# Patient Record
Sex: MALE | Race: WHITE | NOT HISPANIC OR LATINO | Employment: STUDENT | URBAN - METROPOLITAN AREA
[De-identification: names, ages, dates, MRNs, and addresses within clinical notes are randomized per-mention and may not be internally consistent; named-entity substitution may affect disease eponyms.]

---
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
## 2018-05-29 ENCOUNTER — HOSPITAL ENCOUNTER (EMERGENCY)
Facility: HOSPITAL | Age: 23
Discharge: HOME | End: 2018-05-29
Attending: EMERGENCY MEDICINE
Payer: MEDICAID

## 2018-05-29 VITALS
HEART RATE: 102 BPM | RESPIRATION RATE: 18 BRPM | OXYGEN SATURATION: 96 % | WEIGHT: 103 LBS | TEMPERATURE: 100 F | SYSTOLIC BLOOD PRESSURE: 129 MMHG | DIASTOLIC BLOOD PRESSURE: 62 MMHG

## 2018-05-29 DIAGNOSIS — H66.002 ACUTE SUPPURATIVE OTITIS MEDIA OF LEFT EAR WITHOUT SPONTANEOUS RUPTURE OF TYMPANIC MEMBRANE, RECURRENCE NOT SPECIFIED: ICD-10-CM

## 2018-05-29 DIAGNOSIS — R50.9 ACUTE FEBRILE ILLNESS: Primary | ICD-10-CM

## 2018-05-29 DIAGNOSIS — H61.22 IMPACTED CERUMEN OF LEFT EAR: ICD-10-CM

## 2018-05-29 PROCEDURE — 25000003 PHARM REV CODE 250: Performed by: NURSE PRACTITIONER

## 2018-05-29 PROCEDURE — 99283 EMERGENCY DEPT VISIT LOW MDM: CPT | Mod: 25

## 2018-05-29 PROCEDURE — 69209 REMOVE IMPACTED EAR WAX UNI: CPT

## 2018-05-29 RX ORDER — AMOXICILLIN 875 MG/1
875 TABLET, FILM COATED ORAL 2 TIMES DAILY
Qty: 14 TABLET | Refills: 0 | Status: SHIPPED | OUTPATIENT
Start: 2018-05-29 | End: 2019-05-23

## 2018-05-29 RX ORDER — DEXTROAMPHETAMINE SACCHARATE, AMPHETAMINE ASPARTATE MONOHYDRATE, DEXTROAMPHETAMINE SULFATE AND AMPHETAMINE SULFATE 5; 5; 5; 5 MG/1; MG/1; MG/1; MG/1
20 CAPSULE, EXTENDED RELEASE ORAL EVERY MORNING
Status: ON HOLD | COMMUNITY
End: 2019-08-14 | Stop reason: HOSPADM

## 2018-05-29 RX ORDER — IBUPROFEN 400 MG/1
400 TABLET ORAL
Status: COMPLETED | OUTPATIENT
Start: 2018-05-29 | End: 2018-05-29

## 2018-05-29 RX ORDER — AMOXICILLIN 250 MG/1
1000 CAPSULE ORAL
Status: COMPLETED | OUTPATIENT
Start: 2018-05-29 | End: 2018-05-29

## 2018-05-29 RX ORDER — CLONIDINE HYDROCHLORIDE 0.1 MG/1
0.1 TABLET ORAL 2 TIMES DAILY
COMMUNITY
End: 2019-05-23

## 2018-05-29 RX ADMIN — AMOXICILLIN 1000 MG: 250 CAPSULE ORAL at 11:05

## 2018-05-29 RX ADMIN — IBUPROFEN 400 MG: 400 TABLET, FILM COATED ORAL at 10:05

## 2018-05-29 RX ADMIN — CARBAMIDE PEROXIDE 6.5% 5 DROP: 6.5 LIQUID AURICULAR (OTIC) at 10:05

## 2018-05-29 NOTE — ED PROVIDER NOTES
Encounter Date: 5/29/2018  Triage Evaluation: This is an initial triage evaluation. Patient is aware that he is awaiting a bed in the ED and that another provider will follow up on orders placed in triage.  This is a 23 year old male who presents to the ED with complaints of fever and left ear pain. Canals impacted with cerumen on exam. Tylenol given at school; Motrin ordered.  Shandra Wilson, SHERRY     History     Chief Complaint   Patient presents with    Otalgia     mother reports school called with 101.7 and hr 135; pt complains of left ear pain; hx of epilepsy     CC: Fever, Ear Pain     HPI: Kyle Kent, a 23 y.o. male that presents to the ED with his mother for fever and left ear pain.  Mother reports the patient was at school and the school noted he had a fever of 101.7 and elevated heart rate.  She called his primary care doctor who advised him to come to the emergency department.  Patient and mother report no cough, runny nose, sore throat, abdominal pain, urinary symptoms, or vomiting. No medications or treatments given prior to arrival.        The history is provided by the patient and a parent. No  was used.     Review of patient's allergies indicates:   Allergen Reactions    Sulfa (sulfonamide antibiotics)      Past Medical History:   Diagnosis Date    Seizures      No past surgical history on file.  No family history on file.  Social History   Substance Use Topics    Smoking status: Not on file    Smokeless tobacco: Not on file    Alcohol use Not on file     Review of Systems   Constitutional: Positive for fever. Negative for chills.   HENT: Positive for ear pain (Left). Negative for congestion, rhinorrhea, sore throat and trouble swallowing.    Respiratory: Negative for shortness of breath.    Gastrointestinal: Negative for abdominal pain and vomiting.   Genitourinary: Negative for dysuria.   Musculoskeletal: Negative for back pain, neck pain and neck stiffness.   Skin:  Negative for rash and wound.   Neurological: Negative for seizures and syncope.   Psychiatric/Behavioral: Negative for confusion.       Physical Exam     Initial Vitals [05/29/18 1019]   BP Pulse Resp Temp SpO2   125/71 (!) 127 20 97.7 °F (36.5 °C) 97 %      MAP       89         Physical Exam    Nursing note and vitals reviewed.  Constitutional: He appears well-developed and well-nourished. He is not diaphoretic. He is cooperative.  Non-toxic appearance. He does not have a sickly appearance. No distress.   HENT:   Head: Normocephalic and atraumatic.   Right Ear: Tympanic membrane and external ear normal. Tympanic membrane is not erythematous.   Left Ear: External ear normal. Tympanic membrane is erythematous.   Mouth/Throat: Uvula is midline, oropharynx is clear and moist and mucous membranes are normal.   Left ear with cerumen impaction in canal.  Cerumen was removed and TM was erythematous, bulging, with distortion of visual landmarks.    Eyes: Conjunctivae and EOM are normal.   Neck: Full passive range of motion without pain and phonation normal. Neck supple. Normal range of motion present. No neck rigidity.   Cardiovascular: Regular rhythm. Tachycardia present.    Pulses:       Radial pulses are 2+ on the right side, and 2+ on the left side.   Pulmonary/Chest: Effort normal. No tachypnea and no bradypnea. No respiratory distress. He has no wheezes. He has no rhonchi. He has no rales.   Abdominal: Normal appearance. There is no tenderness. There is no rigidity, no rebound and no guarding.   Musculoskeletal: Normal range of motion.   Lymphadenopathy:     He has no cervical adenopathy.        Right cervical: No superficial cervical adenopathy present.       Left cervical: No superficial cervical adenopathy present.   Neurological: He is alert and oriented to person, place, and time. He has normal strength. No sensory deficit. Coordination normal. GCS eye subscore is 4. GCS verbal subscore is 5. GCS motor subscore  is 6.   Skin: Skin is warm and dry. No rash noted.         ED Course   Ear Wax Removal  Date/Time: 5/29/2018 11:36 AM  Performed by: PEEWEE MORENO  Authorized by: BRODERICK CUNNINGHAM   Ceruminolytics applied prior to the procedure.  Medication Used: Debrox.  Location details: left ear  Procedure type: irrigation Cerumen Removal Results: Cerumen completely removed.  Patient tolerance: Patient tolerated the procedure well with no immediate complications        Labs Reviewed - No data to display                APC / Resident Notes:   This is an evaluation of a 23 y.o. male that presents to the Emergency Department for fever and left ear pain. Physical Exam shows a non-toxic, afebrile, and well appearing male.  Breath sounds clear to auscultation. Heart regular rhythm, tachycardic.  Abdomen soft and nontender.  He moves all extremities.  There are no rashes. Right ear in TM normal.  Left external ear normal, cerumen in the left canal.  Cerumen is removed and TM was erythematous, bulging, loss of visual landmarks. During triage, patient's heart rate is elevated, during his stay in the emergency department, his heart rate improved and is 102 on discharge vitals. Vital Signs Are Reassuring. If available, previous records reviewed.     My overall impression is left otitis media and cerumen impaction. I considered, but at this time, do not suspect OE, strep pharyngitis, meningitis, pneumonia.    ED Course: Ibuprofen and Amoxil. D/C Meds:  Amoxil. D/C Information:  Tylenol/ibuprofen as needed. The diagnosis, treatment plan, instructions for follow-up and reevaluation with PCP as well as ED return precautions were discussed and understanding was verbalized. All questions or concerns have been addressed. This case was discussed with and Dr. Cunningham who is in agreement with my assessment and plan. YAJAIRA Huitron, FNP-C                 Clinical Impression:   The primary encounter diagnosis was Acute febrile illness. Diagnoses of  Impacted cerumen of left ear and Acute suppurative otitis media of left ear without spontaneous rupture of tympanic membrane, recurrence not specified were also pertinent to this visit.    Disposition:   Disposition: Discharged  Condition: Stable                        Vipin Oliva, P  05/29/18 1139

## 2018-05-29 NOTE — ED TRIAGE NOTES
PER mom, patient has LEFT sided ear pain since this morning. No runny nose, or headache. Patient had a 101 temp at school and was given tylenol.

## 2018-05-29 NOTE — DISCHARGE INSTRUCTIONS
Please return to the Emergency Department for any new or worsening symptoms including: worsening ear pain, drainage from the ear, not eating or drinking, fever, chest pain, shortness of breath, loss of consciousness, dizziness, weakness, or any other concerns.     Please follow up with your Primary Care Provider within in the week. If you do not have one, you may contact the one listed on your discharge paperwork or you may also call the [facility] Appointment Desk at [phone] to schedule an appointment with one.     Please take all medication as prescribed. Tylenol or Ibuprofen as needed for fever or pain. Amoxil twice a day for 7 days for the ear infection.

## 2018-12-14 ENCOUNTER — HOSPITAL ENCOUNTER (EMERGENCY)
Facility: HOSPITAL | Age: 23
Discharge: HOME | End: 2018-12-14
Attending: EMERGENCY MEDICINE
Payer: MEDICAID

## 2018-12-14 VITALS
OXYGEN SATURATION: 99 % | HEIGHT: 62 IN | DIASTOLIC BLOOD PRESSURE: 57 MMHG | WEIGHT: 110 LBS | BODY MASS INDEX: 20.24 KG/M2 | HEART RATE: 114 BPM | RESPIRATION RATE: 16 BRPM | TEMPERATURE: 97 F | SYSTOLIC BLOOD PRESSURE: 114 MMHG

## 2018-12-14 DIAGNOSIS — R10.84 GENERALIZED ABDOMINAL PAIN: ICD-10-CM

## 2018-12-14 DIAGNOSIS — R25.1 TREMOR, UNSPECIFIED: Primary | ICD-10-CM

## 2018-12-14 DIAGNOSIS — R10.9 ABDOMINAL PAIN: ICD-10-CM

## 2018-12-14 DIAGNOSIS — K59.00 CONSTIPATION, UNSPECIFIED CONSTIPATION TYPE: ICD-10-CM

## 2018-12-14 LAB
ALBUMIN SERPL BCP-MCNC: 4.7 G/DL
ALP SERPL-CCNC: 62 U/L
ALT SERPL W/O P-5'-P-CCNC: 20 U/L
ANION GAP SERPL CALC-SCNC: 10 MMOL/L
AST SERPL-CCNC: 20 U/L
BASOPHILS # BLD AUTO: 0.01 K/UL
BASOPHILS NFR BLD: 0.2 %
BILIRUB SERPL-MCNC: 0.3 MG/DL
BUN SERPL-MCNC: 13 MG/DL
CALCIUM SERPL-MCNC: 10.3 MG/DL
CHLORIDE SERPL-SCNC: 101 MMOL/L
CO2 SERPL-SCNC: 29 MMOL/L
CREAT SERPL-MCNC: 1 MG/DL
DIFFERENTIAL METHOD: ABNORMAL
EOSINOPHIL # BLD AUTO: 0.3 K/UL
EOSINOPHIL NFR BLD: 7.1 %
ERYTHROCYTE [DISTWIDTH] IN BLOOD BY AUTOMATED COUNT: 11.9 %
EST. GFR  (AFRICAN AMERICAN): >60 ML/MIN/1.73 M^2
EST. GFR  (NON AFRICAN AMERICAN): >60 ML/MIN/1.73 M^2
GLUCOSE SERPL-MCNC: 110 MG/DL
HCT VFR BLD AUTO: 45.2 %
HGB BLD-MCNC: 15.9 G/DL
LYMPHOCYTES # BLD AUTO: 1.9 K/UL
LYMPHOCYTES NFR BLD: 43 %
MAGNESIUM SERPL-MCNC: 2.6 MG/DL
MCH RBC QN AUTO: 32.3 PG
MCHC RBC AUTO-ENTMCNC: 35.2 G/DL
MCV RBC AUTO: 92 FL
MONOCYTES # BLD AUTO: 0.5 K/UL
MONOCYTES NFR BLD: 10.9 %
NEUTROPHILS # BLD AUTO: 1.8 K/UL
NEUTROPHILS NFR BLD: 38.8 %
PLATELET # BLD AUTO: 164 K/UL
PMV BLD AUTO: 9.4 FL
POTASSIUM SERPL-SCNC: 4.3 MMOL/L
PROT SERPL-MCNC: 8.4 G/DL
RBC # BLD AUTO: 4.93 M/UL
SODIUM SERPL-SCNC: 140 MMOL/L
WBC # BLD AUTO: 4.51 K/UL

## 2018-12-14 PROCEDURE — 80053 COMPREHEN METABOLIC PANEL: CPT

## 2018-12-14 PROCEDURE — 83735 ASSAY OF MAGNESIUM: CPT

## 2018-12-14 PROCEDURE — 99284 EMERGENCY DEPT VISIT MOD MDM: CPT

## 2018-12-14 PROCEDURE — 85025 COMPLETE CBC W/AUTO DIFF WBC: CPT

## 2018-12-14 PROCEDURE — 25000003 PHARM REV CODE 250: Performed by: EMERGENCY MEDICINE

## 2018-12-14 RX ORDER — SODIUM CHLORIDE 9 MG/ML
500 INJECTION, SOLUTION INTRAVENOUS
Status: COMPLETED | OUTPATIENT
Start: 2018-12-14 | End: 2018-12-14

## 2018-12-14 RX ADMIN — SODIUM CHLORIDE 500 ML: 0.9 INJECTION, SOLUTION INTRAVENOUS at 09:12

## 2018-12-14 NOTE — ED PROVIDER NOTES
"Encounter Date: 12/14/2018    SCRIBE #1 NOTE: I, Harman Gu, am scribing for, and in the presence of,  Zeke Mcmahon MD. I have scribed the following portions of the note - Other sections scribed: HPI, ROS.       History     Chief Complaint   Patient presents with    Seizures/Legs Shaking     seizure this morning, last one in June; denies hitting head or LOC; "mother reports "his legs are shaking;" hx of seizures and compliant with meds     CC: Gait Problem    HPI: This is a 23 y.o. M who has Seizures, and Mental Retardation who presents to the ED accompanied by his mother for emergent evaluation of gait problem that began today. Mother reports a shuffled gait and stiffness in both legs. Pt is also more quiet than usual today. Pt's mother is concerned that the quietness, shuffled gait, and stiffness in both legs are likely pre-seizure symptoms. She reports a similar problem over 1 year ago, in which the pt had similar symptoms, but no seizure. Pt has a Hx of grand mal seizures, which he is compliant with his medications. The pt's mother also states that the pt is aware that his grandmother had a fall recently and it is likely that the pt is fearful of falling as well. No recent antibiotic use, and no recent falls.      The history is provided by the patient. No  was used.     Review of patient's allergies indicates:   Allergen Reactions    Sulfa (sulfonamide antibiotics)      Past Medical History:   Diagnosis Date    MR (mental retardation)     Seizures      History reviewed. No pertinent surgical history.  History reviewed. No pertinent family history.  Social History     Tobacco Use    Smoking status: Never Smoker    Smokeless tobacco: Never Used   Substance Use Topics    Alcohol use: No    Drug use: Not on file     Review of Systems   Unable to perform ROS: Other   Musculoskeletal: Positive for gait problem.       Physical Exam     Initial Vitals [12/14/18 0811]   BP Pulse Resp " Temp SpO2   136/77 (!) 129 20 97.1 °F (36.2 °C) 99 %      MAP       --         Physical Exam    Nursing note and vitals reviewed.  HENT:   Head: Atraumatic.   Eyes: Conjunctivae and EOM are normal.   Neck: Normal range of motion.   Cardiovascular: Exam reveals no gallop and no friction rub.    No murmur heard.  Pulmonary/Chest: Breath sounds normal. No respiratory distress. He has no wheezes. He has no rales.   Abdominal: Soft. Bowel sounds are normal. He exhibits no distension. There is no tenderness.   Musculoskeletal: Normal range of motion. He exhibits no edema.   Neurological: He is alert.   Oriented to person (baseline), moving all extremities without evidence of seizure activity   Skin: Skin is warm and dry.   Psychiatric: He has a normal mood and affect.         ED Course   Procedures  Labs Reviewed   CBC W/ AUTO DIFFERENTIAL - Abnormal; Notable for the following components:       Result Value    MCH 32.3 (*)     All other components within normal limits   COMPREHENSIVE METABOLIC PANEL   MAGNESIUM          Imaging Results          X-Ray Abdomen Flat And Erect (Final result)  Result time 12/14/18 09:22:07    Final result by Rolando Romero Jr., MD (12/14/18 09:22:07)                 Impression:      There is significant amount of feces throughout the colon though no bowel obstruction seen.  Scoliosis noted.      Electronically signed by: Rolando Romero MD  Date:    12/14/2018  Time:    09:22             Narrative:    EXAMINATION:  XR ABDOMEN FLAT AND ERECT    CLINICAL HISTORY:  Unspecified abdominal pain    TECHNIQUE:  Flat and erect AP views of the abdomen were performed.    COMPARISON:  None    FINDINGS:  Scoliosis noted.  Multiple monitoring leads in place.  Significant amount of scattered stool throughout the colon.  No focal bowel dilatation.  No free intraperitoneal air.                               CT Head Without Contrast (Final result)  Result time 12/14/18 09:09:35    Final result by Paramjit  MD Mike (12/14/18 09:09:35)                 Impression:      No acute abnormality.      Electronically signed by: Paramjit Mckeon MD  Date:    12/14/2018  Time:    09:09             Narrative:    EXAMINATION:  CT HEAD WITHOUT CONTRAST    CLINICAL HISTORY:  ? seizure, abnormal gait;    TECHNIQUE:  Low dose axial CT images obtained throughout the head without intravenous contrast. Sagittal and coronal reconstructions were performed.    COMPARISON:  CT of the head 05/04/2016    FINDINGS:  Intracranial compartment:    Ventricles and sulci are normal in size for age without evidence of hydrocephalus. No extra-axial blood or fluid collections.    The brain parenchyma appears normal. No parenchymal mass, hemorrhage, edema or major vascular distribution infarct.    Skull/extracranial contents (limited evaluation): No fracture. Mastoid air cells and paranasal sinuses are essentially clear.                                 Medical Decision Making:   Initial Assessment:   23-year-old male with a history of mental retardation and seizure disorder presents with family today after patient was noted to have some leg shaking and stiff legs with difficulty ambulating.  On exam he is talkative.  He is able to range his legs.  He has very occasional tremor of his leg but no evidence of seizure-like activity.  No evidence of trauma.  Patient's labs are unremarkable. CT brain shows no acute process.  Abdominal films show evidence of constipation without evidence of obstruction.  Patient's mother says that he normally drinks or issues and this helps stimulate a bowel movement.  I will give him a small bolus of IV fluids.  He will drink some orange juice when he gets home and if he does not have bowel movement, I have recommended MiraLax.  Recommend patient follow up with Neurology.  Return instructions given.              Scribe Attestation:   Scribe #1: I performed the above scribed service and the documentation accurately  describes the services I performed. I attest to the accuracy of the note.    Attending Attestation:           Physician Attestation for Scribe:  Physician Attestation Statement for Scribe #1: I, Zeke Mcmahon MD, reviewed documentation, as scribed by Harman Gu in my presence, and it is both accurate and complete.                    Clinical Impression:   The encounter diagnosis was Abdominal pain.                             Zeke Mcmahon MD  12/14/18 0199

## 2018-12-14 NOTE — ED TRIAGE NOTES
"MOTHER REPORTS POSSIBLE SEIZURE THIS AM. MOTHER REPORTS LEGS STARTED "TENSING UP.' WHICH COULD INDICATE HE MAYBE HAVING ONE. PMH EPILEPSY AND GRAND MAL SEIZURES. REPORTS LAST SEIZURE IN June. DENIES HITTING HEAD OR LOC. PT AAOX4. MOTHER REPORTS PT IS MORE QUIET THAN NORMAL. PT C/O TENDER LOWER ABDOMINAL PAIN. PT DENIES PAIN TO LEGS   "

## 2019-05-23 ENCOUNTER — HOSPITAL ENCOUNTER (EMERGENCY)
Facility: HOSPITAL | Age: 24
Discharge: HOME | End: 2019-05-23
Attending: EMERGENCY MEDICINE
Payer: MEDICAID

## 2019-05-23 VITALS
SYSTOLIC BLOOD PRESSURE: 124 MMHG | WEIGHT: 130 LBS | OXYGEN SATURATION: 97 % | DIASTOLIC BLOOD PRESSURE: 74 MMHG | HEIGHT: 66 IN | BODY MASS INDEX: 20.89 KG/M2 | TEMPERATURE: 99 F | RESPIRATION RATE: 20 BRPM | HEART RATE: 94 BPM

## 2019-05-23 DIAGNOSIS — F79 MENTAL RETARDATION: ICD-10-CM

## 2019-05-23 DIAGNOSIS — R46.89 AGGRESSIVE BEHAVIOR: Primary | ICD-10-CM

## 2019-05-23 LAB
ALBUMIN SERPL BCP-MCNC: 4.8 G/DL (ref 3.5–5.2)
ALP SERPL-CCNC: 64 U/L (ref 55–135)
ALT SERPL W/O P-5'-P-CCNC: 33 U/L (ref 10–44)
ANION GAP SERPL CALC-SCNC: 11 MMOL/L (ref 8–16)
APAP SERPL-MCNC: <3 UG/ML (ref 10–20)
AST SERPL-CCNC: 22 U/L (ref 10–40)
BACTERIA #/AREA URNS HPF: ABNORMAL /HPF
BASOPHILS # BLD AUTO: 0.01 K/UL (ref 0–0.2)
BASOPHILS NFR BLD: 0.1 % (ref 0–1.9)
BILIRUB SERPL-MCNC: 0.4 MG/DL (ref 0.1–1)
BILIRUB UR QL STRIP: NEGATIVE
BUN SERPL-MCNC: 15 MG/DL (ref 6–20)
CALCIUM SERPL-MCNC: 10.5 MG/DL (ref 8.7–10.5)
CHLORIDE SERPL-SCNC: 105 MMOL/L (ref 95–110)
CLARITY UR: ABNORMAL
CO2 SERPL-SCNC: 24 MMOL/L (ref 23–29)
COLOR UR: YELLOW
CREAT SERPL-MCNC: 1 MG/DL (ref 0.5–1.4)
DIFFERENTIAL METHOD: ABNORMAL
EOSINOPHIL # BLD AUTO: 0.1 K/UL (ref 0–0.5)
EOSINOPHIL NFR BLD: 1.2 % (ref 0–8)
ERYTHROCYTE [DISTWIDTH] IN BLOOD BY AUTOMATED COUNT: 12 % (ref 11.5–14.5)
EST. GFR  (AFRICAN AMERICAN): >60 ML/MIN/1.73 M^2
EST. GFR  (NON AFRICAN AMERICAN): >60 ML/MIN/1.73 M^2
ETHANOL SERPL-MCNC: <10 MG/DL
GLUCOSE SERPL-MCNC: 111 MG/DL (ref 70–110)
GLUCOSE UR QL STRIP: NEGATIVE
HCT VFR BLD AUTO: 45.7 % (ref 40–54)
HGB BLD-MCNC: 15.6 G/DL (ref 14–18)
HGB UR QL STRIP: NEGATIVE
KETONES UR QL STRIP: NEGATIVE
LEUKOCYTE ESTERASE UR QL STRIP: ABNORMAL
LYMPHOCYTES # BLD AUTO: 1.3 K/UL (ref 1–4.8)
LYMPHOCYTES NFR BLD: 18.2 % (ref 18–48)
MCH RBC QN AUTO: 32 PG (ref 27–31)
MCHC RBC AUTO-ENTMCNC: 34.1 G/DL (ref 32–36)
MCV RBC AUTO: 94 FL (ref 82–98)
MICROSCOPIC COMMENT: ABNORMAL
MONOCYTES # BLD AUTO: 0.5 K/UL (ref 0.3–1)
MONOCYTES NFR BLD: 6.9 % (ref 4–15)
NEUTROPHILS # BLD AUTO: 5.3 K/UL (ref 1.8–7.7)
NEUTROPHILS NFR BLD: 73.6 % (ref 38–73)
NITRITE UR QL STRIP: NEGATIVE
PH UR STRIP: 7 [PH] (ref 5–8)
PLATELET # BLD AUTO: 202 K/UL (ref 150–350)
PMV BLD AUTO: 10 FL (ref 9.2–12.9)
POTASSIUM SERPL-SCNC: 3.8 MMOL/L (ref 3.5–5.1)
PROT SERPL-MCNC: 8.5 G/DL (ref 6–8.4)
PROT UR QL STRIP: NEGATIVE
RBC # BLD AUTO: 4.87 M/UL (ref 4.6–6.2)
SODIUM SERPL-SCNC: 140 MMOL/L (ref 136–145)
SP GR UR STRIP: 1.03 (ref 1–1.03)
TSH SERPL DL<=0.005 MIU/L-ACNC: 1.2 UIU/ML (ref 0.4–4)
URN SPEC COLLECT METH UR: ABNORMAL
UROBILINOGEN UR STRIP-ACNC: NEGATIVE EU/DL
WBC # BLD AUTO: 7.24 K/UL (ref 3.9–12.7)
WBC #/AREA URNS HPF: 3 /HPF (ref 0–5)

## 2019-05-23 PROCEDURE — 99283 EMERGENCY DEPT VISIT LOW MDM: CPT

## 2019-05-23 PROCEDURE — 80175 DRUG SCREEN QUAN LAMOTRIGINE: CPT

## 2019-05-23 PROCEDURE — 80053 COMPREHEN METABOLIC PANEL: CPT

## 2019-05-23 PROCEDURE — 80329 ANALGESICS NON-OPIOID 1 OR 2: CPT

## 2019-05-23 PROCEDURE — 80307 DRUG TEST PRSMV CHEM ANLYZR: CPT

## 2019-05-23 PROCEDURE — 81000 URINALYSIS NONAUTO W/SCOPE: CPT | Mod: 59

## 2019-05-23 PROCEDURE — 85025 COMPLETE CBC W/AUTO DIFF WBC: CPT

## 2019-05-23 PROCEDURE — 80320 DRUG SCREEN QUANTALCOHOLS: CPT

## 2019-05-23 PROCEDURE — 84443 ASSAY THYROID STIM HORMONE: CPT

## 2019-05-23 RX ORDER — CLONIDINE HYDROCHLORIDE 0.2 MG/1
0.2 TABLET ORAL NIGHTLY
Status: ON HOLD | COMMUNITY
End: 2019-08-14 | Stop reason: HOSPADM

## 2019-05-23 RX ORDER — ESCITALOPRAM OXALATE 20 MG/1
20 TABLET ORAL DAILY
Status: ON HOLD | COMMUNITY
End: 2019-08-14 | Stop reason: HOSPADM

## 2019-05-23 RX ORDER — LAMOTRIGINE 100 MG/1
100 TABLET ORAL 2 TIMES DAILY
Status: DISCONTINUED | OUTPATIENT
Start: 2019-05-23 | End: 2019-05-23 | Stop reason: HOSPADM
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
## 2019-05-23 NOTE — ED NOTES
Pt resting comfortably in bed.  Pt has been compliant with care, and non aggressive.  Will continue to monitor patient, while we wait for his mom.

## 2019-05-23 NOTE — ED NOTES
Bed rails are up and call light is within patient reach.   Abdomen soft, nontender, nondistended, bowel sounds present in all 4 quadrants.
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
## 2019-05-24 LAB
AMPHET+METHAMPHET UR QL: NORMAL
BARBITURATES UR QL SCN>200 NG/ML: NEGATIVE
BENZODIAZ UR QL SCN>200 NG/ML: NEGATIVE
BZE UR QL SCN: NEGATIVE
CANNABINOIDS UR QL SCN: NEGATIVE
CREAT UR-MCNC: 184.3 MG/DL (ref 23–375)
METHADONE UR QL SCN>300 NG/ML: NEGATIVE
OPIATES UR QL SCN: NEGATIVE
PCP UR QL SCN>25 NG/ML: NEGATIVE
TOXICOLOGY INFORMATION: NORMAL

## 2019-05-24 NOTE — DISCHARGE INSTRUCTIONS
Please see your psychiatrist tomorrow in the office at 9:00 a.m..  Return to the emergency room if you get worse or if new problems develop.  Please call me at 6:30 a.m. In the morning and I will discuss your laboratory work.  [phone]

## 2019-05-24 NOTE — ED NOTES
Per mother, Dr. Pickard spoke w/ pt's psychiatrist, Dr. Lira, and is being discharged. PEC is being cancelled. Charge nurse will call 's office to notify of PEC being resended.

## 2019-05-24 NOTE — ED NOTES
Spoke to Kaity at [facility] office and notified them that PEC was discontinued by Md and pt is being discharged. I have also notified centralized placement to cancel placement efforts as pt status has been changed.

## 2019-05-29 LAB — LAMOTRIGINE SERPL-MCNC: 8.4 UG/ML (ref 2–15)

## 2019-08-08 PROBLEM — R46.89 AGGRESSION AGGRAVATED: Status: ACTIVE | Noted: 2019-08-08

## 2019-08-08 PROBLEM — R82.90 ABNORMAL URINE FINDING: Status: ACTIVE | Noted: 2019-08-08

## 2019-08-08 PROBLEM — G40.909 EPILEPSY: Chronic | Status: ACTIVE | Noted: 2019-08-08

## 2020-06-26 ENCOUNTER — HOSPITAL ENCOUNTER (EMERGENCY)
Facility: HOSPITAL | Age: 25
Discharge: HOME | End: 2020-06-26
Attending: EMERGENCY MEDICINE
Payer: MEDICAID

## 2020-06-26 VITALS
OXYGEN SATURATION: 96 % | DIASTOLIC BLOOD PRESSURE: 66 MMHG | RESPIRATION RATE: 18 BRPM | SYSTOLIC BLOOD PRESSURE: 119 MMHG | TEMPERATURE: 99 F | HEART RATE: 88 BPM

## 2020-06-26 DIAGNOSIS — G40.909 SEIZURE DISORDER: ICD-10-CM

## 2020-06-26 DIAGNOSIS — R56.9 SEIZURE: Primary | ICD-10-CM

## 2020-06-26 LAB
ALBUMIN SERPL BCP-MCNC: 4.9 G/DL (ref 3.5–5.2)
ALP SERPL-CCNC: 84 U/L (ref 55–135)
ALT SERPL W/O P-5'-P-CCNC: 23 U/L (ref 10–44)
ANION GAP SERPL CALC-SCNC: 11 MMOL/L (ref 8–16)
AST SERPL-CCNC: 21 U/L (ref 10–40)
BACTERIA #/AREA URNS HPF: ABNORMAL /HPF
BASOPHILS # BLD AUTO: 0.01 K/UL (ref 0–0.2)
BASOPHILS NFR BLD: 0.1 % (ref 0–1.9)
BILIRUB SERPL-MCNC: 0.3 MG/DL (ref 0.1–1)
BILIRUB UR QL STRIP: NEGATIVE
BUN SERPL-MCNC: 9 MG/DL (ref 6–20)
CALCIUM SERPL-MCNC: 9.4 MG/DL (ref 8.7–10.5)
CHLORIDE SERPL-SCNC: 105 MMOL/L (ref 95–110)
CLARITY UR: CLEAR
CO2 SERPL-SCNC: 26 MMOL/L (ref 23–29)
COLOR UR: YELLOW
CREAT SERPL-MCNC: 1 MG/DL (ref 0.5–1.4)
DIFFERENTIAL METHOD: ABNORMAL
EOSINOPHIL # BLD AUTO: 0 K/UL (ref 0–0.5)
EOSINOPHIL NFR BLD: 0 % (ref 0–8)
ERYTHROCYTE [DISTWIDTH] IN BLOOD BY AUTOMATED COUNT: 11.9 % (ref 11.5–14.5)
EST. GFR  (AFRICAN AMERICAN): >60 ML/MIN/1.73 M^2
EST. GFR  (NON AFRICAN AMERICAN): >60 ML/MIN/1.73 M^2
GLUCOSE SERPL-MCNC: 101 MG/DL (ref 70–110)
GLUCOSE UR QL STRIP: NEGATIVE
HCT VFR BLD AUTO: 44 % (ref 40–54)
HGB BLD-MCNC: 14.8 G/DL (ref 14–18)
HGB UR QL STRIP: NEGATIVE
HYALINE CASTS #/AREA URNS LPF: 0 /LPF
IMM GRANULOCYTES # BLD AUTO: 0.02 K/UL (ref 0–0.04)
IMM GRANULOCYTES NFR BLD AUTO: 0.2 % (ref 0–0.5)
KETONES UR QL STRIP: ABNORMAL
LEUKOCYTE ESTERASE UR QL STRIP: ABNORMAL
LYMPHOCYTES # BLD AUTO: 1 K/UL (ref 1–4.8)
LYMPHOCYTES NFR BLD: 11 % (ref 18–48)
MCH RBC QN AUTO: 31.5 PG (ref 27–31)
MCHC RBC AUTO-ENTMCNC: 33.6 G/DL (ref 32–36)
MCV RBC AUTO: 94 FL (ref 82–98)
MICROSCOPIC COMMENT: ABNORMAL
MONOCYTES # BLD AUTO: 0.5 K/UL (ref 0.3–1)
MONOCYTES NFR BLD: 5 % (ref 4–15)
NEUTROPHILS # BLD AUTO: 7.5 K/UL (ref 1.8–7.7)
NEUTROPHILS NFR BLD: 83.7 % (ref 38–73)
NITRITE UR QL STRIP: NEGATIVE
NRBC BLD-RTO: 0 /100 WBC
PH UR STRIP: 6 [PH] (ref 5–8)
PLATELET # BLD AUTO: 199 K/UL (ref 150–350)
PMV BLD AUTO: 8.9 FL (ref 9.2–12.9)
POTASSIUM SERPL-SCNC: 3.9 MMOL/L (ref 3.5–5.1)
PROT SERPL-MCNC: 8.1 G/DL (ref 6–8.4)
PROT UR QL STRIP: ABNORMAL
RBC # BLD AUTO: 4.7 M/UL (ref 4.6–6.2)
RBC #/AREA URNS HPF: 6 /HPF (ref 0–4)
SARS-COV-2 RDRP RESP QL NAA+PROBE: NEGATIVE
SODIUM SERPL-SCNC: 142 MMOL/L (ref 136–145)
SP GR UR STRIP: >1.03 (ref 1–1.03)
SQUAMOUS #/AREA URNS HPF: 1 /HPF
URN SPEC COLLECT METH UR: ABNORMAL
UROBILINOGEN UR STRIP-ACNC: ABNORMAL EU/DL
WBC # BLD AUTO: 8.96 K/UL (ref 3.9–12.7)
WBC #/AREA URNS HPF: 3 /HPF (ref 0–5)

## 2020-06-26 PROCEDURE — 81000 URINALYSIS NONAUTO W/SCOPE: CPT

## 2020-06-26 PROCEDURE — 99285 EMERGENCY DEPT VISIT HI MDM: CPT | Mod: 25

## 2020-06-26 PROCEDURE — 25500020 PHARM REV CODE 255: Performed by: EMERGENCY MEDICINE

## 2020-06-26 PROCEDURE — 25000003 PHARM REV CODE 250: Performed by: EMERGENCY MEDICINE

## 2020-06-26 PROCEDURE — 80053 COMPREHEN METABOLIC PANEL: CPT

## 2020-06-26 PROCEDURE — 85025 COMPLETE CBC W/AUTO DIFF WBC: CPT

## 2020-06-26 PROCEDURE — 96360 HYDRATION IV INFUSION INIT: CPT

## 2020-06-26 PROCEDURE — U0002 COVID-19 LAB TEST NON-CDC: HCPCS

## 2020-06-26 RX ADMIN — IOHEXOL 75 ML: 350 INJECTION, SOLUTION INTRAVENOUS at 07:06

## 2020-06-26 RX ADMIN — SODIUM CHLORIDE 1000 ML: 0.9 INJECTION, SOLUTION INTRAVENOUS at 06:06

## 2020-06-26 NOTE — ED PROVIDER NOTES
Encounter Date: 6/26/2020       History     Chief Complaint   Patient presents with    Seizures     EMS reports pt with hx of seizures has a witnessed seizure this AM. pt seen and treated at [place] this AM and discharged. pt mother wants him evaluated again because she states that he is not acting himself      HPI     25-year-old male past medical history ADHD, autism, epilepsy, MR presents with seizure activity this morning without return to baseline.  Mother in  present to assist with history, reports that he has had an increased amount of breakthrough seizures as reported by his neurologist, with recent admission to the hospital 1 week ago for 3 days with EEG and extensive evaluation without any findings.  Social reports that his home where he is living currently he has had 2 seizures last night, was brought to [facility] and discharged this morning with reportedly minimal workup.   another felt that patient was not back to his baseline and is acting more tired and fatigued than normal and was brought to [facility] Emergency Department for further evaluation.  They report his Keppra had been increased to a 1000 b.i.d. in the last 2 days in his medication changes about taken effect yet.  No further medication changes recently, no fevers/chills, no nausea/vomiting, reports some decreased appetite over last few days, as well as increased sleeping, otherwise no further recent abnormalities noted.    Review of patient's allergies indicates:   Allergen Reactions    Sulfa (sulfonamide antibiotics)      Past Medical History:   Diagnosis Date    ADHD (attention deficit hyperactivity disorder)     Autism     Epilepsy     MR (mental retardation)     Seizures      History reviewed. No pertinent surgical history.  History reviewed. No pertinent family history.  Social History     Tobacco Use    Smoking status: Never Smoker    Smokeless tobacco: Never Used   Substance Use Topics     Alcohol use: No    Drug use: Never     Review of Systems   Constitutional: Positive for activity change and fatigue.   HENT: Negative.    Eyes: Negative.    Respiratory: Negative.    Cardiovascular: Negative.    Gastrointestinal: Negative.    Genitourinary: Negative.    Musculoskeletal: Negative.    Skin: Negative.    Neurological: Positive for seizures.       Physical Exam     Initial Vitals   BP Pulse Resp Temp SpO2   06/26/20 1712 06/26/20 1717 06/26/20 1717 06/26/20 1719 06/26/20 1717   112/68 108 17 99 °F (37.2 °C) 96 %      MAP       --                Physical Exam    Nursing note and vitals reviewed.  Constitutional: He is not diaphoretic. No distress.   Young male conversing with ease, able to answer simple questions, follows simple commands without difficulty   HENT:   Head: Normocephalic and atraumatic.   Nose: Nose normal.   Mouth/Throat: No oropharyngeal exudate.   Eyes: EOM are normal. Pupils are equal, round, and reactive to light.   Neck: Normal range of motion. Neck supple. No tracheal deviation present. No JVD present.   Cardiovascular: Regular rhythm and normal heart sounds.   No murmur heard.  Tachycardic   Pulmonary/Chest: Breath sounds normal. No respiratory distress. He has no wheezes. He has no rhonchi. He has no rales.   Abdominal: Soft. Bowel sounds are normal. He exhibits no distension. There is abdominal tenderness (Mild tenderness right upper quadrant right lower quadrant). There is no rebound and no guarding.   Musculoskeletal: Normal range of motion. No tenderness or edema.   Neurological: He is alert. He has normal strength. No cranial nerve deficit.   Oriented to self, not to place/time or situation   Skin: Skin is warm and dry. Capillary refill takes less than 2 seconds. No rash noted. No erythema.         ED Course   Procedures  Labs Reviewed   CBC W/ AUTO DIFFERENTIAL - Abnormal; Notable for the following components:       Result Value    Mean Corpuscular Hemoglobin 31.5 (*)      MPV 8.9 (*)     Gran% 83.7 (*)     Lymph% 11.0 (*)     All other components within normal limits   URINALYSIS, REFLEX TO URINE CULTURE - Abnormal; Notable for the following components:    Specific Gravity, UA >1.030 (*)     Protein, UA 2+ (*)     Ketones, UA 2+ (*)     Urobilinogen, UA 2.0-3.0 (*)     Leukocytes, UA Trace (*)     All other components within normal limits    Narrative:     Preferred Collection Type->Urine, Clean Catch  Specimen Source->Urine   URINALYSIS MICROSCOPIC - Abnormal; Notable for the following components:    RBC, UA 6 (*)     All other components within normal limits    Narrative:     Preferred Collection Type->Urine, Clean Catch  Specimen Source->Urine   COMPREHENSIVE METABOLIC PANEL   SARS-COV-2 RNA AMPLIFICATION, QUAL          Imaging Results          CT Abdomen Pelvis With Contrast (Final result)  Result time 06/26/20 19:41:24    Final result by Skylar Landrum MD (06/26/20 19:41:24)                 Impression:      No acute abdominal or pelvic pathology CT of the abdomen and pelvis with contrast.      Electronically signed by: Skylar Landrum  Date:    06/26/2020  Time:    19:41             Narrative:    EXAMINATION:  CT OF ABDOMEN PELVIS WITH    CLINICAL HISTORY:  RLQ abdominal pain, appendicitis suspected (Age => 15y);    TECHNIQUE:  5 mm enhanced axial images were obtained from the lung bases through the greater trochanters.  Seventy-five mL of Omnipaque 350 was injected.    COMPARISON:  None.    FINDINGS:  The liver, spleen, pancreas, kidneys, and adrenal glands are unremarkable. The gallbladder contains no calcified gallstones.    There is no definite evidence for abdominal adenopathy or ascites.    There are no pelvic masses or adenopathy.  The appendix is normal.    There is no free fluid in the pelvis.    There is mild bibasilar atelectasis    Moderate dextroscoliosis is present.                                                Three 5-year-old male with past medical history  significant as noted above presents with further breakthrough seizures are return to baseline.  Upon assessment patient with mild abdominal pain, right upper right lower quadrant tenderness, in addition to tachycardia and reports of recent decreased appetite.  Workup pursued with CBC/CMP COVID negative, CT abdomen pelvis without any findings acutely.  Patient remaining seizure-free pleasant conversing with ease, tolerating p.o. at time of reassessment.  Discussed extensively with mother and  at bedside further seizure precautions, 1st date, as well as ongoing management and evaluation with his neurologist.  Mother reports frustration over the last month with his seizure activity increasing and seemingly nothing be able to be done at this point.  Discussed with mother further medication adherence, sticking to his routine, ensuring that he is sleeping well and his schedule is not become more disruptive over the last couple of weeks.  Mother also strongly encouraged follow-up with his neurologist in the next couple of days.  At this point time do not suspect serious bacterial infection, meningitis/encephalitis, intra-abdominal surgical emergency, electrolyte abnormality, or any further malignant etiology.  Discussed diagnosis and further treatment with mother, including f/u with PCP in the next week.  Return precautions given and all questions answered.  Mother in understanding of plan.  Pt discharged to home improved and stable.                            Clinical Impression:       ICD-10-CM ICD-9-CM   1. Seizure  R56.9 780.39   2. Seizure disorder  G40.909 345.90             ED Disposition Condition    Discharge Stable        ED Prescriptions     None        Follow-up Information     Follow up With Specialties Details Why Contact Info    [facility] Emergency Medicine Go to  If symptoms worsen [address]  [facility] [id number] [phone]    Jam Cantu MD  Nephrology Schedule an appointment as soon as possible for a visit in 1 week As needed [address] [address]  [address]  [place] [address]  [phone]                                       Bandar Vaca MD  06/27/20 7926

## 2020-06-26 NOTE — ED TRIAGE NOTES
Pt reports to ED via EMS after being discharged from [facility] this AM for witnessed seizure lasting 10 min. Pt was discharged after being stable and medically cleared. Mother called EMS because pt was not at baseline  (playful/goofy) and seemed extra sleepy. Pt has autism and is talking upon presentation but does seem lethargic.

## 2020-07-08 ENCOUNTER — OFFICE VISIT (OUTPATIENT)
Dept: INTERNAL MEDICINE | Facility: CLINIC | Age: 25
End: 2020-07-08
Payer: MEDICAID

## 2020-07-08 VITALS
WEIGHT: 117.38 LBS | HEART RATE: 94 BPM | SYSTOLIC BLOOD PRESSURE: 103 MMHG | HEIGHT: 62 IN | DIASTOLIC BLOOD PRESSURE: 66 MMHG | BODY MASS INDEX: 21.6 KG/M2 | TEMPERATURE: 98 F

## 2020-07-08 DIAGNOSIS — G40.309 GENERALIZED CONVULSIVE EPILEPSY: ICD-10-CM

## 2020-07-08 DIAGNOSIS — R46.89 AGGRESSION AGGRAVATED: ICD-10-CM

## 2020-07-08 DIAGNOSIS — D72.819 LEUKOPENIA, UNSPECIFIED TYPE: ICD-10-CM

## 2020-07-08 DIAGNOSIS — R53.81 MALAISE AND FATIGUE: ICD-10-CM

## 2020-07-08 DIAGNOSIS — F71 MODERATE INTELLECTUAL DISABILITY WITH INTELLIGENCE QUOTIENT 35 TO 49: ICD-10-CM

## 2020-07-08 DIAGNOSIS — R56.9 SEIZURES: ICD-10-CM

## 2020-07-08 DIAGNOSIS — R53.83 MALAISE AND FATIGUE: ICD-10-CM

## 2020-07-08 DIAGNOSIS — Z13.220 SCREENING CHOLESTEROL LEVEL: ICD-10-CM

## 2020-07-08 DIAGNOSIS — F90.2 ATTENTION DEFICIT HYPERACTIVITY DISORDER (ADHD), COMBINED TYPE: ICD-10-CM

## 2020-07-08 DIAGNOSIS — K06.1 GINGIVAL HYPERPLASIA: ICD-10-CM

## 2020-07-08 DIAGNOSIS — Z13.29 THYROID DISORDER SCREEN: ICD-10-CM

## 2020-07-08 DIAGNOSIS — N39.0 URINARY TRACT INFECTION WITHOUT HEMATURIA, SITE UNSPECIFIED: Primary | ICD-10-CM

## 2020-07-08 DIAGNOSIS — R73.03 PRE-DIABETES: ICD-10-CM

## 2020-07-08 DIAGNOSIS — F89 DEVELOPMENTAL DISORDER: ICD-10-CM

## 2020-07-08 DIAGNOSIS — G40.201 PARTIAL SYMPTOMATIC EPILEPSY WITH COMPLEX PARTIAL SEIZURES, NOT INTRACTABLE, WITH STATUS EPILEPTICUS: ICD-10-CM

## 2020-07-08 PROBLEM — G40.209 COMPLEX PARTIAL EPILEPTIC SEIZURE: Status: ACTIVE | Noted: 2018-07-03

## 2020-07-08 LAB
BILIRUB SERPL-MCNC: NEGATIVE MG/DL
BLOOD URINE, POC: NEGATIVE
COLOR, POC UA: YELLOW
GLUCOSE UR QL STRIP: NEGATIVE
KETONES UR QL STRIP: 15
LEUKOCYTE ESTERASE URINE, POC: ABNORMAL
NITRITE, POC UA: NEGATIVE
PH, POC UA: 8
PROTEIN, POC: 15
SPECIFIC GRAVITY, POC UA: 1
UROBILINOGEN, POC UA: NEGATIVE

## 2020-07-08 PROCEDURE — 99204 OFFICE O/P NEW MOD 45 MIN: CPT | Mod: 25,S$GLB,, | Performed by: INTERNAL MEDICINE

## 2020-07-08 PROCEDURE — 99204 PR OFFICE/OUTPT VISIT, NEW, LEVL IV, 45-59 MIN: ICD-10-PCS | Mod: 25,S$GLB,, | Performed by: INTERNAL MEDICINE

## 2020-07-08 PROCEDURE — 81002 POCT URINALYSIS, DIPSTICK OR TABLET REAGENT, AUTOMATED, WITH MICROSCOP: ICD-10-PCS | Mod: S$GLB,,, | Performed by: INTERNAL MEDICINE

## 2020-07-08 PROCEDURE — 81002 URINALYSIS NONAUTO W/O SCOPE: CPT | Mod: S$GLB,,, | Performed by: INTERNAL MEDICINE

## 2020-07-08 RX ORDER — ESCITALOPRAM OXALATE 20 MG/1
TABLET ORAL
Status: ON HOLD | COMMUNITY
Start: 2020-06-16 | End: 2021-03-23 | Stop reason: HOSPADM

## 2020-07-08 RX ORDER — OXCARBAZEPINE 600 MG/1
TABLET, FILM COATED ORAL
COMMUNITY
Start: 2020-06-16 | End: 2020-11-06

## 2020-07-08 RX ORDER — CLONIDINE HYDROCHLORIDE 0.2 MG/1
TABLET ORAL
COMMUNITY
Start: 2020-06-16 | End: 2020-11-06

## 2020-07-08 RX ORDER — CLONIDINE HYDROCHLORIDE 0.1 MG/1
TABLET ORAL
COMMUNITY
Start: 2020-06-16 | End: 2020-11-06

## 2020-07-08 RX ORDER — LAMOTRIGINE 200 MG/1
TABLET ORAL
Status: ON HOLD | COMMUNITY
Start: 2018-01-29 | End: 2021-03-23 | Stop reason: HOSPADM

## 2020-07-08 RX ORDER — LEVETIRACETAM 1000 MG/1
TABLET ORAL 2 TIMES DAILY
Status: ON HOLD | COMMUNITY
Start: 2020-06-25 | End: 2021-03-23 | Stop reason: HOSPADM

## 2020-07-08 NOTE — PROGRESS NOTES
Chief C/o:    Emesis (Pt. c/o nausea and vomiting, weakness and off balance x 4 days.), Dehydration, and ADHD        Health Care Maintenance    Health Maintenance       Date Due Completion Date    Lipid Panel 1996 ---    HIV Screening 02/16/2011 ---    TETANUS VACCINE 02/16/2014 ---    Influenza Vaccine (1) 09/01/2020 11/11/2013                 HISTORY OF PRESENT ILLNESS:    HILDA Kent is a 25 y.o. male who was brought by his mother today to be separate as a patient in this clinic.  He has a past medical history significant for seizure disorder, there is description of partial complex seizure as well as generalized seizure which is probably tonic clonic, his developmental disorder with moderate intellectual disorder.  He is also diagnosed in the past with autism spectrum disorder with accompanying intellectual impairment requiring very substantial support (level 3).  He was seen recently in the hospital with seizures and dehydration, about 2 weeks ago, he had vomiting and witnessed seizures but he is back to his baseline.  His blood sugar was elevated, and he had evidence of urine tract infection at that time as well.  Patient has no fever no chills.  Patient lives with the support of sitters, he can manage fairly well.  He has some mobility disorder, he is able to walk fairly okay, however he uses a tub chair to avoid falls.  Usually sees a neurologist and a psychiatrist, the mother is in the process of trying to find were his psychiatrist is as this seems he left the group he was with and probably is opening new office.  She is in search of a neurologist as well.          ALLERGIES AND MEDICATIONS: updated and reviewed.  Review of patient's allergies indicates:   Allergen Reactions    Sulfa (sulfonamide antibiotics) Swelling     Medication List with Changes/Refills   Current Medications    CLONIDINE (CATAPRES) 0.1 MG TABLET        CLONIDINE (CATAPRES) 0.2 MG TABLET        ESCITALOPRAM OXALATE  (LEXAPRO) 20 MG TABLET        LAMOTRIGINE (LAMICTAL) 200 MG TABLET    lamotrigine 200 mg tablet   TAKE ONE TABLET BY MOUTH TWICE DAILY    LEVETIRACETAM (KEPPRA) 1000 MG TABLET        OXCARBAZEPINE (TRILEPTAL) 600 MG TAB       Discontinued Medications    GUANFACINE (TENEX) 1 MG TAB    Take 0.5 tablets (0.5 mg total) by mouth 3 (three) times daily.    LAMOTRIGINE (LAMICTAL) 100 MG TABLET    Take 1 tablet (100 mg total) by mouth nightly.    LAMOTRIGINE (LAMICTAL) 25 MG TABLET    Take 1 tablet (25 mg total) by mouth once daily.    OXCARBAZEPINE (TRILEPTAL) 150 MG TAB    Take 3 tablets (450 mg total) by mouth 2 (two) times daily.             CARE TEAM:    Patient Care Team:  Toan Dunn MD as PCP - General (Internal Medicine)         REVIEW OF SYSTEMS:    Review of Systems   Constitutional: Positive for fatigue. Negative for appetite change, chills, diaphoresis, fever and unexpected weight change.   HENT: Negative for congestion, drooling, ear discharge, ear pain, facial swelling, hearing loss, nosebleeds, rhinorrhea, sinus pain, sneezing, sore throat, tinnitus, trouble swallowing and voice change.    Eyes: Negative for pain, discharge, redness, itching and visual disturbance.   Respiratory: Negative for cough, choking, chest tightness, shortness of breath, wheezing and stridor.    Cardiovascular: Negative for chest pain, palpitations and leg swelling.   Gastrointestinal: Negative for abdominal distention, abdominal pain, blood in stool, constipation, diarrhea, nausea and vomiting.   Endocrine: Negative for cold intolerance, heat intolerance, polydipsia, polyphagia and polyuria.   Genitourinary: Negative for difficulty urinating, dysuria, flank pain, frequency, hematuria and urgency.   Musculoskeletal: Negative for arthralgias, back pain, gait problem, joint swelling, myalgias, neck pain and neck stiffness.   Skin: Negative for color change, pallor, rash and wound.   Allergic/Immunologic: Negative for  "environmental allergies, food allergies and immunocompromised state.   Neurological: Positive for seizures and weakness. Negative for dizziness, tremors, syncope, speech difficulty, light-headedness, numbness and headaches.        Intellectual and developmental impairment   Hematological: Negative for adenopathy. Does not bruise/bleed easily.   Psychiatric/Behavioral: Negative for agitation, behavioral problems, confusion, decreased concentration, dysphoric mood, hallucinations, sleep disturbance and suicidal ideas. The patient is not nervous/anxious.          PHYSICAL EXAM:    Vitals:    07/08/20 1413   BP: 103/66   Pulse: 94   Temp: 98.3 °F (36.8 °C)     Weight: 53.3 kg (117 lb 6.3 oz)   Height: 5' 2.21" (158 cm)   Body mass index is 21.33 kg/m².  Vitals:    07/08/20 1413   BP: 103/66   Pulse: 94   Temp: 98.3 °F (36.8 °C)   TempSrc: Temporal   Weight: 53.3 kg (117 lb 6.3 oz)   Height: 5' 2.21" (1.58 m)   PainSc: 0-No pain          Physical Exam   Constitutional: He appears well-developed and well-nourished.  Non-toxic appearance. He does not appear ill. No distress.   This is a pleasant patient, cooperative, who has developmental impairment and does not look in distress at this time.  He is able to talk somewhat, but does not express himself well.  Patient looks fragile but in no gross distress   HENT:   Head: Normocephalic and atraumatic.   Right Ear: Tympanic membrane, external ear and ear canal normal.   Left Ear: Tympanic membrane, external ear and ear canal normal.   Nose: Nose normal. No rhinorrhea or nasal congestion.   Mouth/Throat: Mucous membranes are moist. No oropharyngeal exudate or posterior oropharyngeal erythema (Positive gingival hyperplasia). Oropharynx is clear.   Eyes: Pupils are equal, round, and reactive to light. Conjunctivae are normal. Right eye exhibits no discharge. Left eye exhibits no discharge. No scleral icterus.   Neck: Normal range of motion. Neck supple. No JVD present. No " muscular tenderness present. No neck rigidity. No tracheal deviation present. No thyromegaly present.   Cardiovascular: Normal rate, regular rhythm, normal heart sounds and normal pulses. Exam reveals no gallop and no friction rub.   No murmur heard.  Pulmonary/Chest: Effort normal and breath sounds normal. No stridor. No respiratory distress. He has no wheezes. He has no rhonchi. He has no rales. He exhibits no tenderness.   Abdominal: Soft. Bowel sounds are normal. He exhibits no distension and no mass. There is no abdominal tenderness. There is no rebound and no guarding. No hernia.   Genitourinary:    Genitourinary Comments: No costovertebral angle tenderness.     Musculoskeletal: Normal range of motion.         General: No swelling, tenderness, deformity or signs of injury.      Right lower leg: No edema.      Left lower leg: No edema.   Lymphadenopathy:     He has no cervical adenopathy.   Neurological: He is alert. He displays no weakness and normal reflexes. No cranial nerve deficit or sensory deficit. He exhibits normal muscle tone. Coordination normal.   Patient is oriented to self/person, not to time and not to place   Skin: Skin is warm and dry. Capillary refill takes less than 2 seconds. No bruising, no lesion and no rash noted. He is not diaphoretic. No erythema. No jaundice or pallor.   Psychiatric: His behavior is normal. Mood, judgment and thought content normal.   Nursing note and vitals reviewed.         Labs:    Lab Results   Component Value Date     06/26/2020     06/26/2020    K 3.9 06/26/2020     06/26/2020    CO2 26 06/26/2020    BUN 9 06/26/2020    CREATININE 1.0 06/26/2020    CALCIUM 9.4 06/26/2020    PROT 8.1 06/26/2020    ALBUMIN 4.9 06/26/2020    BILITOT 0.3 06/26/2020    ALKPHOS 84 06/26/2020    AST 21 06/26/2020    ALT 23 06/26/2020    ANIONGAP 11 06/26/2020    ESTGFRAFRICA >60 06/26/2020    EGFRNONAA >60 06/26/2020     Lab Results   Component Value Date    WBC 8.96  06/26/2020    RBC 4.70 06/26/2020    HGB 14.8 06/26/2020    HCT 44.0 06/26/2020    MCV 94 06/26/2020    RDW 11.9 06/26/2020     06/26/2020      No results found for: CHOL, TRIG, HDL, LDLCALC, TOTALCHOLEST  Lab Results   Component Value Date    TSH 1.202 05/23/2019     No results found for: HGBA1C, ESTIMATEDAVG       ASSESSMENT & PLAN:    1. Partial symptomatic epilepsy with complex partial seizures, not intractable, with status epilepticus  - levETIRAcetam (KEPPRA) 1000 MG tablet  - lamoTRIgine (LAMICTAL) 200 MG tablet; lamotrigine 200 mg tablet   TAKE ONE TABLET BY MOUTH TWICE DAILY  - OXcarbazepine (TRILEPTAL) 600 MG Tab    2. Generalized convulsive epilepsy  - levETIRAcetam (KEPPRA) 1000 MG tablet  - lamoTRIgine (LAMICTAL) 200 MG tablet; lamotrigine 200 mg tablet   TAKE ONE TABLET BY MOUTH TWICE DAILY  - OXcarbazepine (TRILEPTAL) 600 MG Tab    3. Seizures  - levETIRAcetam (KEPPRA) 1000 MG tablet  - lamoTRIgine (LAMICTAL) 200 MG tablet; lamotrigine 200 mg tablet   TAKE ONE TABLET BY MOUTH TWICE DAILY  - OXcarbazepine (TRILEPTAL) 600 MG Tab    4. Moderate intellectual disability with intelligence quotient 35 to 49    5. Developmental disorder  - cloNIDine (CATAPRES) 0.1 MG tablet  - cloNIDine (CATAPRES) 0.2 MG tablet  - escitalopram oxalate (LEXAPRO) 20 MG tablet    6. Attention deficit hyperactivity disorder (ADHD), combined type  - cloNIDine (CATAPRES) 0.1 MG tablet  - cloNIDine (CATAPRES) 0.2 MG tablet  - escitalopram oxalate (LEXAPRO) 20 MG tablet    7. Pre-diabetes  - Comprehensive metabolic panel; Future  - Comprehensive metabolic panel    8. Leukopenia, unspecified type  - CBC auto differential; Future  - CBC auto differential    9. Gingival hyperplasia    10. Urinary tract infection without hematuria, site unspecified  - POCT urinalysis, dipstick or tablet reag  - POCT urinalysis, dipstick or tablet reag  Had a urinary tract infection which is resolved, urine here was normal  11. BMI 21.0-21.9,  adult    12. Thyroid disorder screen  - TSH; Future  - T4, free; Future  - TSH  - T4, free    13. Screening cholesterol level  - Lipid Panel; Future  - Lipid Panel    14. Aggression aggravated  - cloNIDine (CATAPRES) 0.1 MG tablet  - cloNIDine (CATAPRES) 0.2 MG tablet  - escitalopram oxalate (LEXAPRO) 20 MG tablet  Was hospitalized for that and it has resolved  15. Malaise and fatigue  - Comprehensive metabolic panel; Future  - TSH; Future  - T4, free; Future  - Comprehensive metabolic panel  - TSH    Patient with multiple medical problem including seizures seems to be of several types, intellectual disability and impairment with significant cognitive impairment history of developmental disorder, prediabetes, leukopenia, just recovering from urinary tract infection, had history of aggressive behavior for which he was hospitalized he was doing well at this time.  He is managed at home with sitters and with the supervision of his sister who brought him with her today.  Sister is in the process of finding and establishing him with his old or new psychiatrist and neurologist, she has me to issue a PCP rule and evaluate him.  Will start with comprehensive blood testing, I asked her to keep everything the same for now until we see him after all these labs, she was offered help to find a psychiatrist or neurologist if she wants to however she will be looking for that by herself at this time, as she indicated.      Orders Placed This Encounter   Procedures    CBC auto differential    Comprehensive metabolic panel    Lipid Panel    TSH    T4, free    POCT urinalysis, dipstick or tablet reag    POCT urinalysis, dipstick or tablet reag      No follow-ups on file. or sooner as needed.    Patient was counseled and questions and concerns were addressed.    Please note:  Parts of this report were done using a dictation software, voice to text, and sometimes the text contains some uncorrected words or sentences that are  missed during revision.

## 2020-07-12 LAB
ALBUMIN SERPL-MCNC: 5.1 G/DL (ref 4.1–5.2)
ALBUMIN/GLOB SERPL: 1.9 {RATIO} (ref 1.2–2.2)
ALP SERPL-CCNC: 98 IU/L (ref 39–117)
ALT SERPL-CCNC: 18 IU/L (ref 0–44)
AST SERPL-CCNC: 17 IU/L (ref 0–40)
BASOPHILS # BLD AUTO: 0 X10E3/UL (ref 0–0.2)
BASOPHILS NFR BLD AUTO: 0 %
BILIRUB SERPL-MCNC: 0.4 MG/DL (ref 0–1.2)
BUN SERPL-MCNC: 9 MG/DL (ref 6–20)
BUN/CREAT SERPL: 9 (ref 9–20)
CALCIUM SERPL-MCNC: 10 MG/DL (ref 8.7–10.2)
CHLORIDE SERPL-SCNC: 103 MMOL/L (ref 96–106)
CHOLEST SERPL-MCNC: 173 MG/DL (ref 100–199)
CO2 SERPL-SCNC: 25 MMOL/L (ref 20–29)
CREAT SERPL-MCNC: 1.05 MG/DL (ref 0.76–1.27)
EOSINOPHIL # BLD AUTO: 0 X10E3/UL (ref 0–0.4)
EOSINOPHIL NFR BLD AUTO: 0 %
ERYTHROCYTE [DISTWIDTH] IN BLOOD BY AUTOMATED COUNT: 13.1 % (ref 11.6–15.4)
GLOBULIN SER CALC-MCNC: 2.7 G/DL (ref 1.5–4.5)
GLUCOSE SERPL-MCNC: 87 MG/DL (ref 65–99)
HCT VFR BLD AUTO: 47.4 % (ref 37.5–51)
HDLC SERPL-MCNC: 46 MG/DL
HGB BLD-MCNC: 15.2 G/DL (ref 13–17.7)
IMM GRANULOCYTES # BLD AUTO: 0 X10E3/UL (ref 0–0.1)
IMM GRANULOCYTES NFR BLD AUTO: 0 %
INTERPRETATION: NORMAL
LDLC SERPL CALC-MCNC: 108 MG/DL (ref 0–99)
LYMPHOCYTES # BLD AUTO: 1.8 X10E3/UL (ref 0.7–3.1)
LYMPHOCYTES NFR BLD AUTO: 39 %
MCH RBC QN AUTO: 31.3 PG (ref 26.6–33)
MCHC RBC AUTO-ENTMCNC: 32.1 G/DL (ref 31.5–35.7)
MCV RBC AUTO: 98 FL (ref 79–97)
MONOCYTES # BLD AUTO: 0.4 X10E3/UL (ref 0.1–0.9)
MONOCYTES NFR BLD AUTO: 9 %
NEUTROPHILS # BLD AUTO: 2.5 X10E3/UL (ref 1.4–7)
NEUTROPHILS NFR BLD AUTO: 52 %
PLATELET # BLD AUTO: 229 X10E3/UL (ref 150–450)
POTASSIUM SERPL-SCNC: 4.1 MMOL/L (ref 3.5–5.2)
PROT SERPL-MCNC: 7.8 G/DL (ref 6–8.5)
RBC # BLD AUTO: 4.85 X10E6/UL (ref 4.14–5.8)
SODIUM SERPL-SCNC: 146 MMOL/L (ref 134–144)
T4 FREE SERPL-MCNC: 1.09 NG/DL (ref 0.82–1.77)
TRIGL SERPL-MCNC: 94 MG/DL (ref 0–149)
TSH SERPL DL<=0.005 MIU/L-ACNC: 1.09 UIU/ML (ref 0.45–4.5)
VLDLC SERPL CALC-MCNC: 19 MG/DL (ref 5–40)
WBC # BLD AUTO: 4.7 X10E3/UL (ref 3.4–10.8)

## 2020-07-14 ENCOUNTER — OFFICE VISIT (OUTPATIENT)
Dept: INTERNAL MEDICINE | Facility: CLINIC | Age: 25
End: 2020-07-14
Payer: MEDICAID

## 2020-07-14 VITALS
HEART RATE: 73 BPM | SYSTOLIC BLOOD PRESSURE: 108 MMHG | HEIGHT: 62 IN | WEIGHT: 117.94 LBS | DIASTOLIC BLOOD PRESSURE: 68 MMHG | TEMPERATURE: 99 F | BODY MASS INDEX: 21.7 KG/M2

## 2020-07-14 DIAGNOSIS — F71 MODERATE INTELLECTUAL DISABILITY WITH INTELLIGENCE QUOTIENT 35 TO 49: ICD-10-CM

## 2020-07-14 DIAGNOSIS — S01.81XS FACIAL LACERATION, SEQUELA: ICD-10-CM

## 2020-07-14 DIAGNOSIS — G40.309 GENERALIZED CONVULSIVE EPILEPSY: Primary | ICD-10-CM

## 2020-07-14 PROCEDURE — 99213 OFFICE O/P EST LOW 20 MIN: CPT | Mod: S$GLB,,, | Performed by: INTERNAL MEDICINE

## 2020-07-14 PROCEDURE — 99213 PR OFFICE/OUTPT VISIT, EST, LEVL III, 20-29 MIN: ICD-10-PCS | Mod: S$GLB,,, | Performed by: INTERNAL MEDICINE

## 2020-07-14 NOTE — PROGRESS NOTES
Chief C/o:    Laceration (Pt. pulled out stitch in chin x 2 days ago.), ADHD, and Seizures        Health Care Maintenance    Health Maintenance       Date Due Completion Date    HIV Screening 02/16/2011 ---    Influenza Vaccine (1) 09/01/2020 11/11/2013    TETANUS VACCINE 05/07/2030 5/7/2020                 HISTORY OF PRESENT ILLNESS:    HILDA Kent is a 25 y.o. male who presents to the clinic today for Laceration (Pt. pulled out stitch in chin x 2 days ago.), ADHD, and Seizures  .   Patient is coming for follow-up regarding lab test, no chest pain or shortness of breath nausea no vomiting.  He had a laceration in the chin which sutures he removed the suture, there is no bleeding, pain is minor.                ALLERGIES AND MEDICATIONS: updated and reviewed.  Review of patient's allergies indicates:   Allergen Reactions    Sulfa (sulfonamide antibiotics) Swelling     Medication List with Changes/Refills   Current Medications    CLONIDINE (CATAPRES) 0.1 MG TABLET        CLONIDINE (CATAPRES) 0.2 MG TABLET        ESCITALOPRAM OXALATE (LEXAPRO) 20 MG TABLET        LAMOTRIGINE (LAMICTAL) 200 MG TABLET    lamotrigine 200 mg tablet   TAKE ONE TABLET BY MOUTH TWICE DAILY    LEVETIRACETAM (KEPPRA) 1000 MG TABLET        OXCARBAZEPINE (TRILEPTAL) 600 MG TAB                 CARE TEAM:    Patient Care Team:  Toan Dunn MD as PCP - General (Internal Medicine)         REVIEW OF SYSTEMS:    Review of Systems  Constitutional: Positive for fatigue. Negative for appetite change, chills, diaphoresis, fever and unexpected weight change.   HENT: Negative for congestion, drooling, ear discharge, ear pain, facial swelling, hearing loss, nosebleeds, rhinorrhea, sinus pain, sneezing, sore throat, tinnitus, trouble swallowing and voice change.    Eyes: Negative for pain, discharge, redness, itching and visual disturbance.   Respiratory: Negative for cough, choking, chest tightness, shortness of breath, wheezing and  "stridor.    Cardiovascular: Negative for chest pain, palpitations and leg swelling.   Gastrointestinal: Negative for abdominal distention, abdominal pain, blood in stool, constipation, diarrhea, nausea and vomiting.   Skin:  There is a suture laceration in the chin about 1.5 cm, sutures were partially removed by the patient.  PHYSICAL EXAM:    Vitals:    07/14/20 1002   BP: 108/68   Pulse: 73   Temp: 98.5 °F (36.9 °C)     Weight: 53.5 kg (117 lb 15.1 oz)   Height: 5' 2" (157.5 cm)   Body mass index is 21.57 kg/m².  Vitals:    07/14/20 1002   BP: 108/68   Pulse: 73   Temp: 98.5 °F (36.9 °C)   TempSrc: Temporal   Weight: 53.5 kg (117 lb 15.1 oz)   Height: 5' 2" (1.575 m)   PainSc: 0-No pain          Physical Exam   Constitutional: He appears well-developed and well-nourished.  Non-toxic appearance. He does not appear ill. No distress.   This is a pleasant patient, cooperative, who has developmental impairment and does not look in distress at this time.  He is able to talk somewhat, but does not express himself well.  Patient looks fragile but in no gross distress  Skin:  There is a laceration in the chin about 1.5 cm healing well with no signs infection, there is site where his a piece of a suture is there are which was removed with no problem.      Labs:    Lab Results   Component Value Date    GLU 87 07/10/2020     (H) 07/10/2020    K 4.1 07/10/2020     07/10/2020    CO2 25 07/10/2020    BUN 9 07/10/2020    CREATININE 1.05 07/10/2020    CALCIUM 10.0 07/10/2020    PROT 7.8 07/10/2020    ALBUMIN 5.1 07/10/2020    BILITOT 0.4 07/10/2020    ALKPHOS 98 07/10/2020    AST 17 07/10/2020    ALT 18 07/10/2020    ANIONGAP 11 06/26/2020    ESTGFRAFRICA >60 06/26/2020    EGFRNONAA 99 07/10/2020     Lab Results   Component Value Date    WBC 4.7 07/10/2020    RBC 4.85 07/10/2020    HGB 15.2 07/10/2020    HCT 47.4 07/10/2020    MCV 98 (H) 07/10/2020    RDW 13.1 07/10/2020     07/10/2020      Lab Results "   Component Value Date    CHOL 173 07/10/2020    TRIG 94 07/10/2020    HDL 46 07/10/2020    LDLCALC 108 (H) 07/10/2020     Lab Results   Component Value Date    TSH 1.090 07/10/2020     No results found for: HGBA1C, ESTIMATEDAVG       ASSESSMENT & PLAN:    1. Generalized convulsive epilepsy    2. Moderate intellectual disability with intelligence quotient 35 to 49    3. BMI 21.0-21.9, adult    4. Facial laceration, on restoration of the chin, healing well       Patient Yoan have a minor ulceration in his chin which was done in the emergency room last time he had the seizure, this was missed last visit it was there, patient was trying to shave his beard which was somewhat growing and he cut his sutures.  The sutures are partially removed all and I would remove the rest of them, the wound looks healing well, does not need any new suturing or taping.  To watch for infection.      No orders of the defined types were placed in this encounter.     Follow up in about 6 months (around 1/14/2021), or if symptoms worsen or fail to improve. or sooner as needed.    Patient was counseled and questions and concerns were addressed.    Please note:  Parts of this report were done using a dictation software, voice to text, and sometimes the text contains some uncorrected words or sentences that are missed during revision.

## 2020-08-21 ENCOUNTER — OFFICE VISIT (OUTPATIENT)
Dept: INTERNAL MEDICINE | Facility: CLINIC | Age: 25
End: 2020-08-21
Payer: MEDICAID

## 2020-08-21 VITALS
TEMPERATURE: 98 F | HEART RATE: 88 BPM | BODY MASS INDEX: 21.54 KG/M2 | WEIGHT: 117.06 LBS | DIASTOLIC BLOOD PRESSURE: 70 MMHG | SYSTOLIC BLOOD PRESSURE: 112 MMHG | HEIGHT: 62 IN

## 2020-08-21 DIAGNOSIS — F84.0 AUTISM SPECTRUM DISORDER WITH ACCOMPANYING INTELLECTUAL IMPAIRMENT, REQUIRING VERY SUBSTANTIAL SUPPORT (LEVEL 3): ICD-10-CM

## 2020-08-21 DIAGNOSIS — K29.00 ACUTE GASTRITIS WITHOUT HEMORRHAGE, UNSPECIFIED GASTRITIS TYPE: Primary | ICD-10-CM

## 2020-08-21 PROCEDURE — 99213 OFFICE O/P EST LOW 20 MIN: CPT | Mod: S$GLB,,, | Performed by: INTERNAL MEDICINE

## 2020-08-21 PROCEDURE — 99213 PR OFFICE/OUTPT VISIT, EST, LEVL III, 20-29 MIN: ICD-10-PCS | Mod: S$GLB,,, | Performed by: INTERNAL MEDICINE

## 2020-08-21 RX ORDER — LEVETIRACETAM 500 MG/1
TABLET ORAL
COMMUNITY
Start: 2020-08-18 | End: 2020-11-06

## 2020-08-21 RX ORDER — DICYCLOMINE HYDROCHLORIDE 10 MG/1
10 CAPSULE ORAL 4 TIMES DAILY PRN
Qty: 20 CAPSULE | Refills: 0 | Status: SHIPPED | OUTPATIENT
Start: 2020-08-21 | End: 2020-09-20

## 2020-08-21 RX ORDER — PANTOPRAZOLE SODIUM 40 MG/1
40 TABLET, DELAYED RELEASE ORAL DAILY
Qty: 30 TABLET | Refills: 0 | Status: SHIPPED | OUTPATIENT
Start: 2020-08-21 | End: 2020-11-06

## 2020-08-21 RX ORDER — LEVETIRACETAM 750 MG/1
TABLET ORAL
COMMUNITY
Start: 2020-06-05 | End: 2020-11-06

## 2020-08-21 RX ORDER — LAMOTRIGINE 100 MG/1
TABLET ORAL
COMMUNITY
Start: 2020-08-19 | End: 2020-11-06

## 2020-08-21 RX ORDER — ERGOCALCIFEROL 1.25 MG/1
CAPSULE ORAL
Status: ON HOLD | COMMUNITY
Start: 2020-07-16 | End: 2021-03-23 | Stop reason: HOSPADM

## 2020-08-21 NOTE — PATIENT INSTRUCTIONS
Abdominal Pain, Adhesions from Surgery  Surgery on the abdomen can cause bands of fibrous scar tissue (also known as adhesions) to form. This is the most common side effect of any abdominal surgery. Adhesions can form bands around the intestine and cause a partial or complete blockage of the intestinal tract (intestinal obstruction).  A blocked intestine requires surgery.  Symptoms  Abdominal adhesions can cause these symptoms:  · Severe abdominal pain, acute or chronic  · Nausea and vomiting  · Bloating  · Inability to pass gas or stool  Adhesions are more common in people who have had one or more abdominal surgeries. Diagnosis is made using blood tests, X-ray, CT scan, rectal exam, and (in women) pelvic exam. Abdominal adhesions are permanent. They can be treated by surgery to remove the scar tissue. However, this treatment may create more scar tissue, and the problem may reoccur.  Pelvic adhesions can cause female infertility.  Home care  · Rest as needed, until feeling better.  · Eat a diet low in fiber (called a low-residue diet). Foods allowed include refined breads, white rice, fruit and vegetable juices without pulp, tender meats. These foods will pass more easily through the intestine.  · Avoid whole-grain foods, whole fruits and vegetables, tough meats, seeds and nuts until your symptoms go away.  Follow-up care  Follow up with your healthcare provider, or as advised.   If X-rays were done, they will be read by a radiologist and you will be notified if there are any changes.  Call 911  Call 911 if any of the following occur:  · Trouble breathing  · Confusion  · Very drowsy or trouble awakening  · Fainting or loss of consciousness  · Rapid heart rate  When to seek medical advice  Call your healthcare provider right away if any of these occur:  · Pain gets worse or moves to the right lower abdomen  · New or worsening vomiting or diarrhea  · Swelling of the abdomen  · Unable to pass stool for more than 3  days  · New fever over 100.4º F (38º C), or rising fever  · Blood in vomit or bowel movements (dark red or black color)  · Weakness, dizziness or fainting  · Chest, arm, back, neck or jaw pain  · (In women): Unexpected vaginal bleeding or missed period  Date Last Reviewed: 12/30/2015 © 2000-2017 [employer]. [address] [place] [address], [place] [address]. All rights reserved. This information is not intended as a substitute for professional medical care. Always follow your healthcare professional's instructions.        Gastritis (Adult)    Gastritis is inflammation and irritation of the stomach lining. It can be present for a short time (acute) or be long lasting (chronic). Gastritis is often caused by infection with bacteria called H pylori. More than a third of people in the  have this bacteria in their bodies. In many cases, H pylori causes no problems or symptoms. In some people, though, the infection irritates the stomach lining and causes gastritis. Other causes of stomach irritation include drinking alcohol or taking pain-relieving medicines called NSAIDs (such as aspirin or ibuprofen).   Symptoms of gastritis can include:  · Abdominal pain or bloating  · Loss of appetite  · Nausea or vomiting  · Vomiting blood or having black stools  · Feeling more tired than usual  An inflamed and irritated stomach lining is more likely to develop a sore called an ulcer. To help prevent this, gastritis should be treated.  Home care  If needed, medicines may be prescribed. If you have H pylori infection, treating it will likely relieve your symptoms. Other changes can help reduce stomach irritation and help it heal.  · If you have been prescribed medicines for H pylori infection, take them as directed. Take all of the medicine until it is finished or your healthcare provider tells you to stop, even if you feel better.  · Your healthcare provider may recommend avoiding NSAIDs. If you take daily aspirin for your  heart or other medical reasons, do not stop without talking to your healthcare provider first.  · Avoid drinking alcohol.  · Stop smoking. Smoking can irritate the stomach and delay healing. As much as possible, stay away from second hand smoke.  Follow-up care  Follow up with your healthcare provider, or as advised by our staff. Testing may be needed to check for inflammation or an ulcer.  When to seek medical advice  Call your healthcare provider for any of the following:  · Stomach pain that gets worse or moves to the lower right abdomen (appendix area)  · Chest pain that appears or gets worse, or spreads to the back, neck, shoulder, or arm  · Frequent vomiting (cant keep down liquids)  · Blood in the stool or vomit (red or black in color)  · Feeling weak or dizzy  · Fever of 100.4ºF (38ºC) or higher, or as directed by your healthcare provider  Date Last Reviewed: 6/22/2015  © 9325-6351 [employer]. [address] [place] [address], [place] [address]. All rights reserved. This information is not intended as a substitute for professional medical care. Always follow your healthcare professional's instructions.

## 2020-08-21 NOTE — PROGRESS NOTES
Chief C/o:    Seizures, Developmental Delay, and Abdominal Pain (Pt. c/o lower abdominal pain since this a.m. Pt. has been having normal bowel movements Per Caregiver.)        Health Care Maintenance    Health Maintenance       Date Due Completion Date    Hepatitis C Screening 1996 ---    HIV Screening 02/16/2011 ---    Influenza Vaccine (1) 09/01/2020 11/11/2013    TETANUS VACCINE 05/07/2030 5/7/2020                 HISTORY OF PRESENT ILLNESS:    HILDA Kent is a 25 y.o. male who presents to the clinic today for Seizures, Developmental Delay, and Abdominal Pain (Pt. c/o lower abdominal pain since this a.m. Pt. has been having normal bowel movements Per Caregiver.)  .  Abdominal pain since this morning, mostly in the epigastric area, crampy, no heartburn, no change in bowel movements, no fever and no chills.                  ALLERGIES AND MEDICATIONS: updated and reviewed.  Review of patient's allergies indicates:   Allergen Reactions    Sulfa (sulfonamide antibiotics) Swelling     Medication List with Changes/Refills   New Medications    DICYCLOMINE (BENTYL) 10 MG CAPSULE    Take 1 capsule (10 mg total) by mouth 4 (four) times daily as needed.    PANTOPRAZOLE (PROTONIX) 40 MG TABLET    Take 1 tablet (40 mg total) by mouth once daily.   Current Medications    CLONIDINE (CATAPRES) 0.1 MG TABLET        CLONIDINE (CATAPRES) 0.2 MG TABLET        ERGOCALCIFEROL (ERGOCALCIFEROL) 50,000 UNIT CAP        ESCITALOPRAM OXALATE (LEXAPRO) 20 MG TABLET        LAMOTRIGINE (LAMICTAL) 100 MG TABLET        LAMOTRIGINE (LAMICTAL) 200 MG TABLET    lamotrigine 200 mg tablet   TAKE ONE TABLET BY MOUTH TWICE DAILY    LEVETIRACETAM (KEPPRA) 1000 MG TABLET        LEVETIRACETAM (KEPPRA) 500 MG TAB        LEVETIRACETAM (KEPPRA) 750 MG TAB    TK 1 T PO  BID    OXCARBAZEPINE (TRILEPTAL) 600 MG TAB                 CARE TEAM:    Patient Care Team:  Toan Dunn MD as PCP - General (Internal Medicine)         REVIEW OF  "SYSTEMS:    Constitutional: Positive for fatigue. Negative for appetite change, chills, diaphoresis, fever and unexpected weight change.   HENT: Negative for congestion, drooling, ear discharge, ear pain, facial swelling, hearing loss, nosebleeds, rhinorrhea, sinus pain, sneezing, sore throat, tinnitus, trouble swallowing and voice change.    Eyes: Negative for pain, discharge, redness, itching and visual disturbance.   Respiratory: Negative for cough, choking, chest tightness, shortness of breath, wheezing and stridor.    Cardiovascular: Negative for chest pain, palpitations and leg swelling.   Gastrointestinal: Negative for abdominal distention, + abdominal pain as per HPI, blood in stool, constipation, diarrhea, nausea and vomiting.     PHYSICAL EXAM:    Vitals:    08/21/20 0918   BP: 112/70   Pulse: 88   Temp: 98.1 °F (36.7 °C)     Weight: 53.1 kg (117 lb 1 oz)   Height: 5' 2" (157.5 cm)   Body mass index is 21.41 kg/m².  Vitals:    08/21/20 0918   BP: 112/70   Pulse: 88   Temp: 98.1 °F (36.7 °C)   TempSrc: Temporal   Weight: 53.1 kg (117 lb 1 oz)   Height: 5' 2" (1.575 m)   PainSc:   4   PainLoc: Abdomen          Physical Exam   Constitutional:  Non-toxic appearance. He does not appear ill. No distress.   Patient looks comfortable, in no distress.   HENT:   Right Ear: Tympanic membrane, external ear and ear canal normal.   Left Ear: Tympanic membrane, external ear and ear canal normal.   Nose: No rhinorrhea or nasal congestion.   Mouth/Throat: Mucous membranes are moist. No oropharyngeal exudate or posterior oropharyngeal erythema. Oropharynx is clear.   Eyes: Pupils are equal, round, and reactive to light. Conjunctivae are normal. Right eye exhibits no discharge. Left eye exhibits no discharge.   Neck: No muscular tenderness present. No neck rigidity.   Cardiovascular: Normal rate, regular rhythm and normal pulses.   No murmur heard.  Pulmonary/Chest: Effort normal. No stridor. No respiratory distress. He has " no wheezes. He has no rhonchi.   Abdominal: Soft. Bowel sounds are normal. He exhibits no distension and no mass. There is abdominal tenderness (Mild tenderness in epigastric area and around the umbilicus.  Negative rebound tenderness). There is no rebound and no guarding.   Skin: He is not diaphoretic.          Labs:    Lab Results   Component Value Date    GLU 87 07/10/2020     (H) 07/10/2020    K 4.1 07/10/2020     07/10/2020    CO2 25 07/10/2020    BUN 9 07/10/2020    CREATININE 1.05 07/10/2020    CALCIUM 10.0 07/10/2020    PROT 7.8 07/10/2020    ALBUMIN 5.1 07/10/2020    BILITOT 0.4 07/10/2020    ALKPHOS 98 07/10/2020    AST 17 07/10/2020    ALT 18 07/10/2020    ANIONGAP 11 06/26/2020    ESTGFRAFRICA >60 06/26/2020    EGFRNONAA 99 07/10/2020     Lab Results   Component Value Date    WBC 4.7 07/10/2020    RBC 4.85 07/10/2020    HGB 15.2 07/10/2020    HCT 47.4 07/10/2020    MCV 98 (H) 07/10/2020    RDW 13.1 07/10/2020     07/10/2020      Lab Results   Component Value Date    CHOL 173 07/10/2020    TRIG 94 07/10/2020    HDL 46 07/10/2020    LDLCALC 108 (H) 07/10/2020     Lab Results   Component Value Date    TSH 1.090 07/10/2020     No results found for: HGBA1C, ESTIMATEDAVG       ASSESSMENT & PLAN:    1. Acute gastritis without hemorrhage, unspecified gastritis type  - dicyclomine (BENTYL) 10 MG capsule; Take 1 capsule (10 mg total) by mouth 4 (four) times daily as needed.  Dispense: 20 capsule; Refill: 0  - pantoprazole (PROTONIX) 40 MG tablet; Take 1 tablet (40 mg total) by mouth once daily.  Dispense: 30 tablet; Refill: 0    2. Autism spectrum disorder with accompanying intellectual impairment, requiring very substantial support (level 3)    3. BMI 21.0-21.9, adult       Abdominal pain seems benign at this time, consistent with gastritis and probably an element of intestinal colic.  However should be observed, if the pain increased or new symptoms to the emergency room for evaluation and  workup.      No orders of the defined types were placed in this encounter.     Follow up if symptoms worsen or fail to improve. or sooner as needed.    Patient was counseled and questions and concerns were addressed.    Please note:  Parts of this report were done using a dictation software, voice to text, and sometimes the text contains some uncorrected words or sentences that are missed during revision.

## 2020-09-16 ENCOUNTER — OFFICE VISIT (OUTPATIENT)
Dept: INTERNAL MEDICINE | Facility: CLINIC | Age: 25
End: 2020-09-16
Payer: MEDICAID

## 2020-09-16 VITALS
WEIGHT: 115.94 LBS | BODY MASS INDEX: 21.34 KG/M2 | SYSTOLIC BLOOD PRESSURE: 132 MMHG | TEMPERATURE: 98 F | DIASTOLIC BLOOD PRESSURE: 75 MMHG | HEIGHT: 62 IN | HEART RATE: 109 BPM

## 2020-09-16 DIAGNOSIS — N39.0 URINARY TRACT INFECTION WITHOUT HEMATURIA, SITE UNSPECIFIED: ICD-10-CM

## 2020-09-16 DIAGNOSIS — F71 MODERATE INTELLECTUAL DISABILITY WITH INTELLIGENCE QUOTIENT 35 TO 49: ICD-10-CM

## 2020-09-16 DIAGNOSIS — R46.89 AGGRESSION AGGRAVATED: ICD-10-CM

## 2020-09-16 DIAGNOSIS — G40.201 PARTIAL SYMPTOMATIC EPILEPSY WITH COMPLEX PARTIAL SEIZURES, NOT INTRACTABLE, WITH STATUS EPILEPTICUS: ICD-10-CM

## 2020-09-16 DIAGNOSIS — F90.2 ATTENTION DEFICIT HYPERACTIVITY DISORDER (ADHD), COMBINED TYPE: ICD-10-CM

## 2020-09-16 DIAGNOSIS — F89 DEVELOPMENTAL DISORDER: ICD-10-CM

## 2020-09-16 DIAGNOSIS — F84.0 AUTISM SPECTRUM DISORDER WITH ACCOMPANYING INTELLECTUAL IMPAIRMENT, REQUIRING VERY SUBSTANTIAL SUPPORT (LEVEL 3): Primary | ICD-10-CM

## 2020-09-16 DIAGNOSIS — G40.309 GENERALIZED CONVULSIVE EPILEPSY: ICD-10-CM

## 2020-09-16 LAB
BILIRUB SERPL-MCNC: NEGATIVE MG/DL
BLOOD URINE, POC: NEGATIVE
COLOR, POC UA: YELLOW
GLUCOSE UR QL STRIP: NEGATIVE
KETONES UR QL STRIP: NEGATIVE
LEUKOCYTE ESTERASE URINE, POC: NEGATIVE
NITRITE, POC UA: NEGATIVE
PH, POC UA: 7.5
PROTEIN, POC: 2000
SPECIFIC GRAVITY, POC UA: 1.01
UROBILINOGEN, POC UA: NEGATIVE

## 2020-09-16 PROCEDURE — 81002 POCT URINALYSIS, DIPSTICK OR TABLET REAGENT, AUTOMATED, WITH MICROSCOP: ICD-10-PCS | Mod: S$GLB,,, | Performed by: INTERNAL MEDICINE

## 2020-09-16 PROCEDURE — 99214 PR OFFICE/OUTPT VISIT, EST, LEVL IV, 30-39 MIN: ICD-10-PCS | Mod: 25,S$GLB,, | Performed by: INTERNAL MEDICINE

## 2020-09-16 PROCEDURE — 81002 URINALYSIS NONAUTO W/O SCOPE: CPT | Mod: S$GLB,,, | Performed by: INTERNAL MEDICINE

## 2020-09-16 PROCEDURE — 99214 OFFICE O/P EST MOD 30 MIN: CPT | Mod: 25,S$GLB,, | Performed by: INTERNAL MEDICINE

## 2020-09-16 RX ORDER — LORAZEPAM 1 MG/1
TABLET ORAL
COMMUNITY
Start: 2020-08-28 | End: 2020-11-06

## 2020-09-16 RX ORDER — MIDAZOLAM 5 MG/.1ML
SPRAY NASAL
COMMUNITY
Start: 2020-09-10 | End: 2020-11-06
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
## 2020-10-13 ENCOUNTER — TELEPHONE (OUTPATIENT)
Dept: INTERNAL MEDICINE | Facility: CLINIC | Age: 25
End: 2020-10-13

## 2020-10-13 DIAGNOSIS — R73.03 PRE-DIABETES: Primary | ICD-10-CM

## 2020-10-13 DIAGNOSIS — R53.81 MALAISE AND FATIGUE: ICD-10-CM

## 2020-10-13 DIAGNOSIS — R53.83 MALAISE AND FATIGUE: ICD-10-CM

## 2020-10-16 LAB
ALBUMIN SERPL-MCNC: 4.6 G/DL (ref 4.1–5.2)
ALBUMIN/GLOB SERPL: 1.5 {RATIO} (ref 1.2–2.2)
ALP SERPL-CCNC: 53 IU/L (ref 39–117)
ALT SERPL-CCNC: 67 IU/L (ref 0–44)
AST SERPL-CCNC: 35 IU/L (ref 0–40)
BILIRUB SERPL-MCNC: 0.3 MG/DL (ref 0–1.2)
BUN SERPL-MCNC: 14 MG/DL (ref 6–20)
BUN/CREAT SERPL: 15 (ref 9–20)
CALCIUM SERPL-MCNC: 10 MG/DL (ref 8.7–10.2)
CHLORIDE SERPL-SCNC: 98 MMOL/L (ref 96–106)
CO2 SERPL-SCNC: 27 MMOL/L (ref 20–29)
CREAT SERPL-MCNC: 0.93 MG/DL (ref 0.76–1.27)
GLOBULIN SER CALC-MCNC: 3 G/DL (ref 1.5–4.5)
GLUCOSE SERPL-MCNC: 110 MG/DL (ref 65–99)
POTASSIUM SERPL-SCNC: 4 MMOL/L (ref 3.5–5.2)
PROT SERPL-MCNC: 7.6 G/DL (ref 6–8.5)
SODIUM SERPL-SCNC: 140 MMOL/L (ref 134–144)
TESTOST FREE SERPL-MCNC: 9.9 PG/ML (ref 9.3–26.5)
TESTOST SERPL-MCNC: 374 NG/DL (ref 264–916)

## 2020-11-06 RX ORDER — RISPERIDONE 1 MG/1
1 TABLET ORAL DAILY
Status: ON HOLD | COMMUNITY
Start: 2020-09-25 | End: 2021-03-23 | Stop reason: HOSPADM

## 2020-11-06 RX ORDER — OXCARBAZEPINE 300 MG/1
TABLET, FILM COATED ORAL 2 TIMES DAILY
Status: ON HOLD | COMMUNITY
Start: 2020-09-25 | End: 2021-03-23 | Stop reason: HOSPADM

## 2020-11-06 RX ORDER — OXCARBAZEPINE 150 MG/1
TABLET, FILM COATED ORAL 2 TIMES DAILY
Status: ON HOLD | COMMUNITY
Start: 2020-09-25 | End: 2021-03-23 | Stop reason: HOSPADM

## 2020-11-06 RX ORDER — RISPERIDONE 4 MG/1
4 TABLET ORAL NIGHTLY
Status: ON HOLD | COMMUNITY
Start: 2020-09-25 | End: 2021-03-23 | Stop reason: HOSPADM

## 2020-11-27 ENCOUNTER — HOSPITAL ENCOUNTER (EMERGENCY)
Facility: HOSPITAL | Age: 25
Discharge: PSYCHIATRIC OR SUBSTANCE USE FACILITY | End: 2020-11-30
Attending: EMERGENCY MEDICINE
Payer: MEDICAID

## 2020-11-27 DIAGNOSIS — R46.89 AGGRESSIVE BEHAVIOR: Primary | ICD-10-CM

## 2020-11-27 DIAGNOSIS — Z00.8 MEDICAL CLEARANCE FOR PSYCHIATRIC ADMISSION: ICD-10-CM

## 2020-11-27 LAB
ALBUMIN SERPL BCP-MCNC: 4.9 G/DL (ref 3.5–5.2)
ALP SERPL-CCNC: 52 U/L (ref 55–135)
ALT SERPL W/O P-5'-P-CCNC: 26 U/L (ref 10–44)
AMPHET+METHAMPHET UR QL: NEGATIVE
ANION GAP SERPL CALC-SCNC: 9 MMOL/L (ref 8–16)
APAP SERPL-MCNC: <3 UG/ML (ref 10–20)
AST SERPL-CCNC: 22 U/L (ref 10–40)
BARBITURATES UR QL SCN>200 NG/ML: NEGATIVE
BASOPHILS # BLD AUTO: 0.01 K/UL (ref 0–0.2)
BASOPHILS NFR BLD: 0.1 % (ref 0–1.9)
BENZODIAZ UR QL SCN>200 NG/ML: NEGATIVE
BILIRUB SERPL-MCNC: 0.3 MG/DL (ref 0.1–1)
BILIRUB UR QL STRIP: NEGATIVE
BUN SERPL-MCNC: 16 MG/DL (ref 6–20)
BZE UR QL SCN: NEGATIVE
CALCIUM SERPL-MCNC: 9.8 MG/DL (ref 8.7–10.5)
CANNABINOIDS UR QL SCN: NEGATIVE
CHLORIDE SERPL-SCNC: 105 MMOL/L (ref 95–110)
CLARITY UR: CLEAR
CO2 SERPL-SCNC: 26 MMOL/L (ref 23–29)
COLOR UR: NORMAL
CREAT SERPL-MCNC: 1.1 MG/DL (ref 0.5–1.4)
CREAT UR-MCNC: 80.6 MG/DL (ref 23–375)
CTP QC/QA: YES
DIFFERENTIAL METHOD: ABNORMAL
EOSINOPHIL # BLD AUTO: 0 K/UL (ref 0–0.5)
EOSINOPHIL NFR BLD: 0.6 % (ref 0–8)
ERYTHROCYTE [DISTWIDTH] IN BLOOD BY AUTOMATED COUNT: 11.7 % (ref 11.5–14.5)
EST. GFR  (AFRICAN AMERICAN): >60 ML/MIN/1.73 M^2
EST. GFR  (NON AFRICAN AMERICAN): >60 ML/MIN/1.73 M^2
ETHANOL SERPL-MCNC: <10 MG/DL
GLUCOSE SERPL-MCNC: 94 MG/DL (ref 70–110)
GLUCOSE UR QL STRIP: NEGATIVE
HCT VFR BLD AUTO: 44.5 % (ref 40–54)
HGB BLD-MCNC: 15.9 G/DL (ref 14–18)
HGB UR QL STRIP: NEGATIVE
IMM GRANULOCYTES # BLD AUTO: 0.01 K/UL (ref 0–0.04)
IMM GRANULOCYTES NFR BLD AUTO: 0.1 % (ref 0–0.5)
KETONES UR QL STRIP: NEGATIVE
LEUKOCYTE ESTERASE UR QL STRIP: NEGATIVE
LYMPHOCYTES # BLD AUTO: 2.2 K/UL (ref 1–4.8)
LYMPHOCYTES NFR BLD: 31.9 % (ref 18–48)
MCH RBC QN AUTO: 32.6 PG (ref 27–31)
MCHC RBC AUTO-ENTMCNC: 35.7 G/DL (ref 32–36)
MCV RBC AUTO: 91 FL (ref 82–98)
METHADONE UR QL SCN>300 NG/ML: NEGATIVE
MONOCYTES # BLD AUTO: 0.6 K/UL (ref 0.3–1)
MONOCYTES NFR BLD: 8.6 % (ref 4–15)
NEUTROPHILS # BLD AUTO: 4.1 K/UL (ref 1.8–7.7)
NEUTROPHILS NFR BLD: 58.7 % (ref 38–73)
NITRITE UR QL STRIP: NEGATIVE
NRBC BLD-RTO: 0 /100 WBC
OPIATES UR QL SCN: NEGATIVE
PCP UR QL SCN>25 NG/ML: NEGATIVE
PH UR STRIP: 6 [PH] (ref 5–8)
PLATELET # BLD AUTO: 176 K/UL (ref 150–350)
PMV BLD AUTO: 9.3 FL (ref 9.2–12.9)
POTASSIUM SERPL-SCNC: 3.8 MMOL/L (ref 3.5–5.1)
PROT SERPL-MCNC: 8.3 G/DL (ref 6–8.4)
PROT UR QL STRIP: NEGATIVE
RBC # BLD AUTO: 4.88 M/UL (ref 4.6–6.2)
SALICYLATES SERPL-MCNC: <5 MG/DL (ref 15–30)
SARS-COV-2 RDRP RESP QL NAA+PROBE: NEGATIVE
SODIUM SERPL-SCNC: 140 MMOL/L (ref 136–145)
SP GR UR STRIP: 1.01 (ref 1–1.03)
TOXICOLOGY INFORMATION: NORMAL
TSH SERPL DL<=0.005 MIU/L-ACNC: 1.88 UIU/ML (ref 0.4–4)
URN SPEC COLLECT METH UR: NORMAL
UROBILINOGEN UR STRIP-ACNC: NEGATIVE EU/DL
WBC # BLD AUTO: 7 K/UL (ref 3.9–12.7)

## 2020-11-27 PROCEDURE — 80320 DRUG SCREEN QUANTALCOHOLS: CPT

## 2020-11-27 PROCEDURE — 85025 COMPLETE CBC W/AUTO DIFF WBC: CPT

## 2020-11-27 PROCEDURE — 84443 ASSAY THYROID STIM HORMONE: CPT

## 2020-11-27 PROCEDURE — 80307 DRUG TEST PRSMV CHEM ANLYZR: CPT

## 2020-11-27 PROCEDURE — 93010 EKG 12-LEAD: ICD-10-PCS | Mod: ,,, | Performed by: INTERNAL MEDICINE

## 2020-11-27 PROCEDURE — 93005 ELECTROCARDIOGRAM TRACING: CPT

## 2020-11-27 PROCEDURE — 80329 ANALGESICS NON-OPIOID 1 OR 2: CPT

## 2020-11-27 PROCEDURE — 93010 ELECTROCARDIOGRAM REPORT: CPT | Mod: ,,, | Performed by: INTERNAL MEDICINE

## 2020-11-27 PROCEDURE — U0002 COVID-19 LAB TEST NON-CDC: HCPCS | Performed by: EMERGENCY MEDICINE

## 2020-11-27 PROCEDURE — 81003 URINALYSIS AUTO W/O SCOPE: CPT

## 2020-11-27 PROCEDURE — 80053 COMPREHEN METABOLIC PANEL: CPT

## 2020-11-27 PROCEDURE — 99285 EMERGENCY DEPT VISIT HI MDM: CPT | Mod: 25

## 2020-11-27 PROCEDURE — 80175 DRUG SCREEN QUAN LAMOTRIGINE: CPT

## 2020-11-27 RX ORDER — QUETIAPINE FUMARATE 100 MG/1
100 TABLET, FILM COATED ORAL ONCE
Status: COMPLETED | OUTPATIENT
Start: 2020-11-28 | End: 2020-11-28

## 2020-11-27 NOTE — ED TRIAGE NOTES
Patient presents to the ER after behavioral changes at home and running away from home without clothing on.  Mother states recent medication changes.

## 2020-11-28 PROCEDURE — 25000003 PHARM REV CODE 250: Performed by: EMERGENCY MEDICINE

## 2020-11-28 RX ADMIN — QUETIAPINE FUMARATE 100 MG: 100 TABLET ORAL at 12:11

## 2020-11-28 NOTE — ED PROVIDER NOTES
Encounter Date: 11/27/2020    SCRIBE #1 NOTE: I, Kameron Betancourt, am scribing for, and in the presence of,  Austin Trejo MD. I have scribed the following portions of the note - Other sections scribed: HPI,ROS,PE.       History     Chief Complaint   Patient presents with    Psychiatric Evaluation     pt comes in via EMS from home with c/o being violent at home. hx of MR.      25 y.o. male with past medical history of developmental disorder presenting via EMS for behavior problem. Per mother, patient is typically calm and cooperative, however, patient's medication has recently been changed which caused behavior issues. Mother reports patient ran away from home today with no clothing.    The history is provided by a relative. The history is limited by a developmental delay. No  was used.     Review of patient's allergies indicates:   Allergen Reactions    Sulfa (sulfonamide antibiotics) Swelling     Past Medical History:   Diagnosis Date    Developmental disorder     Seizures      No past surgical history on file.  Family History   Problem Relation Age of Onset    No Known Problems Mother     Lung cancer Father     Epilepsy Sister      Social History     Tobacco Use    Smoking status: Never Smoker    Smokeless tobacco: Never Used   Substance Use Topics    Alcohol use: Never     Frequency: Never     Binge frequency: Never    Drug use: Never     Review of Systems   Unable to perform ROS: Psychiatric disorder (history of MR)   Psychiatric/Behavioral: Positive for behavioral problems.   All other systems reviewed and are negative.      Physical Exam     Initial Vitals [11/27/20 1648]   BP Pulse Resp Temp SpO2   123/79 99 18 98.7 °F (37.1 °C) 97 %      MAP       --         Physical Exam    Nursing note and vitals reviewed.  Constitutional: He appears well-developed and well-nourished. He is not diaphoretic. No distress.   HENT:   Head: Normocephalic and atraumatic.   Right Ear: External ear  normal.   Left Ear: External ear normal.   Mouth/Throat: Oropharynx is clear and moist.   Eyes: EOM are normal. Pupils are equal, round, and reactive to light. Right eye exhibits no discharge. Left eye exhibits no discharge. No scleral icterus.   Neck: Normal range of motion. No thyromegaly present. No tracheal deviation present. No JVD present.   Cardiovascular: Normal rate, regular rhythm, normal heart sounds and intact distal pulses. Exam reveals no gallop and no friction rub.    No murmur heard.  Pulmonary/Chest: Breath sounds normal. No stridor. No respiratory distress. He has no wheezes. He has no rhonchi. He has no rales. He exhibits no tenderness.   Abdominal: Soft. Bowel sounds are normal. He exhibits no distension and no mass. There is no abdominal tenderness. There is no rebound and no guarding.   Musculoskeletal: Normal range of motion. No tenderness or edema.   Neurological: He is alert and oriented to person, place, and time. He has normal strength. No cranial nerve deficit. GCS score is 15. GCS eye subscore is 4. GCS verbal subscore is 5. GCS motor subscore is 6.   RUBIO with NGND's   Skin: Skin is warm and dry. Capillary refill takes less than 2 seconds. No rash noted. No erythema. No pallor.   Psychiatric: He has a normal mood and affect. His speech is normal and behavior is normal. He is not actively hallucinating. Thought content is not paranoid and not delusional. Cognition and memory are not impaired. He expresses impulsivity and inappropriate judgment. He expresses no homicidal and no suicidal ideation. He expresses no suicidal plans and no homicidal plans. He exhibits normal recent memory and normal remote memory. He is attentive.         ED Course   Procedures  Labs Reviewed   CBC W/ AUTO DIFFERENTIAL - Abnormal; Notable for the following components:       Result Value    MCH 32.6 (*)     All other components within normal limits   COMPREHENSIVE METABOLIC PANEL - Abnormal; Notable for the  following components:    Alkaline Phosphatase 52 (*)     All other components within normal limits   ACETAMINOPHEN LEVEL - Abnormal; Notable for the following components:    Acetaminophen (Tylenol), Serum <3.0 (*)     All other components within normal limits   SALICYLATE LEVEL - Abnormal; Notable for the following components:    Salicylate Lvl <5.0 (*)     All other components within normal limits   TSH   URINALYSIS, REFLEX TO URINE CULTURE    Narrative:     Specimen Source->Urine   DRUG SCREEN PANEL, URINE EMERGENCY    Narrative:     Specimen Source->Urine   ALCOHOL,MEDICAL (ETHANOL)   SARS-COV-2 RNA AMPLIFICATION, QUAL   LAMOTRIGINE LEVEL   OXCARBAZEPINE METABOLITE (MHC)   SARS-COV-2 RDRP GENE          Imaging Results    None          Medical Decision Making:   History:   Old Medical Records: I decided to obtain old medical records.  Initial Assessment:   25 y.o. male with past medical history of developmental disorder presenting for behavior problems. Mother reports pt's medication have been changed causing behavioral issues. Patient ran away from home with no clothing today. Upon examination, patient is calm and cooperative. He is in no distress.   ED Management:  7:34 PM: I spoke to Willian from ACT team (phone #[phone]) that reported patient had been aggressive towards mother today. He is concerned that patient is not taking his medication or medication is not working properly. They agree that patient needs to be PEC.             Scribe Attestation:   Scribe #1: I performed the above scribed service and the documentation accurately describes the services I performed. I attest to the accuracy of the note.    Pt arrived alert, afebrile, non-toxic in appearance, in no acute respiratory distress with VSS.  Pt discussed with ACT Team (Willian) who stated the patient had been aggressive and violent throughout the day today.  PEC placed due to violent behavior.  Pt is stable for psych evaluation.  Will continue  to monitor and await transport by EMS.    Austin Trejo MD    This is doctor Neeraj dictating.  I received a phone call from , who feels that this patient should go forward with the pec and placement to a psychiatric facility.  She spoke to the mother and the nurse.  He recommends continuing the patient's Lamictal Keppra Trileptal and p.r.n. Zyprexa.  I examined this patient at 1:53 p.m..  He is cooperative, but he wants to go home.  The lungs are clear the heart tones are normal.  The abdomen is soft.                      Clinical Impression:     ICD-10-CM ICD-9-CM   1. Aggressive behavior  R46.89 V40.39   2. Medical clearance for psychiatric admission  Z00.8 V70.8                          ED Disposition Condition    Transfer to Another Facility Stable              I, Austin Trejo, personally performed the services described in this documentation. All medical record entries made by the scribe were at my direction and in my presence.  I have reviewed the chart and agree that the record reflects my personal performance and is accurate and complete.    Austin Trejo MD  11/27/20 4792       Jermaine Pickard MD  11/30/20 3731

## 2020-11-29 LAB
ANION GAP SERPL CALC-SCNC: 28 MMOL/L (ref 8–16)
BUN SERPL-MCNC: 12 MG/DL (ref 6–30)
CHLORIDE SERPL-SCNC: 103 MMOL/L (ref 95–110)
CREAT SERPL-MCNC: 0.8 MG/DL (ref 0.5–1.4)
GLUCOSE SERPL-MCNC: 161 MG/DL (ref 70–110)
HCT VFR BLD CALC: 50 %PCV (ref 36–54)
POC IONIZED CALCIUM: 1.26 MMOL/L (ref 1.06–1.42)
POC TCO2 (MEASURED): 15 MMOL/L (ref 23–29)
POCT GLUCOSE: 166 MG/DL (ref 70–110)
POTASSIUM BLD-SCNC: 3.5 MMOL/L (ref 3.5–5.1)
SAMPLE: ABNORMAL
SODIUM BLD-SCNC: 141 MMOL/L (ref 136–145)

## 2020-11-29 PROCEDURE — 80177 DRUG SCRN QUAN LEVETIRACETAM: CPT

## 2020-11-29 PROCEDURE — 82565 ASSAY OF CREATININE: CPT

## 2020-11-29 PROCEDURE — 84132 ASSAY OF SERUM POTASSIUM: CPT

## 2020-11-29 PROCEDURE — 80183 DRUG SCRN QUANT OXCARBAZEPIN: CPT

## 2020-11-29 PROCEDURE — 25000003 PHARM REV CODE 250: Performed by: STUDENT IN AN ORGANIZED HEALTH CARE EDUCATION/TRAINING PROGRAM

## 2020-11-29 PROCEDURE — 82330 ASSAY OF CALCIUM: CPT

## 2020-11-29 PROCEDURE — 84295 ASSAY OF SERUM SODIUM: CPT

## 2020-11-29 PROCEDURE — 85014 HEMATOCRIT: CPT

## 2020-11-29 PROCEDURE — 99900035 HC TECH TIME PER 15 MIN (STAT)

## 2020-11-29 RX ORDER — LEVETIRACETAM 500 MG/1
1000 TABLET ORAL 2 TIMES DAILY
Status: DISCONTINUED | OUTPATIENT
Start: 2020-11-29 | End: 2020-11-30 | Stop reason: HOSPADM

## 2020-11-29 RX ORDER — OXCARBAZEPINE 150 MG/1
450 TABLET, FILM COATED ORAL ONCE
Status: COMPLETED | OUTPATIENT
Start: 2020-11-29 | End: 2020-11-29

## 2020-11-29 RX ORDER — LAMOTRIGINE 100 MG/1
200 TABLET ORAL ONCE
Status: COMPLETED | OUTPATIENT
Start: 2020-11-29 | End: 2020-11-29

## 2020-11-29 RX ORDER — LEVETIRACETAM 500 MG/1
1000 TABLET ORAL ONCE
Status: COMPLETED | OUTPATIENT
Start: 2020-11-29 | End: 2020-11-29

## 2020-11-29 RX ORDER — OXCARBAZEPINE 150 MG/1
450 TABLET, FILM COATED ORAL 2 TIMES DAILY
Status: DISCONTINUED | OUTPATIENT
Start: 2020-11-29 | End: 2020-11-30 | Stop reason: HOSPADM

## 2020-11-29 RX ADMIN — LEVETIRACETAM 1000 MG: 500 TABLET ORAL at 10:11

## 2020-11-29 RX ADMIN — LEVETIRACETAM 1000 MG: 500 TABLET ORAL at 08:11

## 2020-11-29 RX ADMIN — OXCARBAZEPINE 450 MG: 150 TABLET, FILM COATED ORAL at 08:11

## 2020-11-29 RX ADMIN — LAMOTRIGINE 200 MG: 100 TABLET ORAL at 08:11

## 2020-11-29 RX ADMIN — OXCARBAZEPINE 450 MG: 150 TABLET, FILM COATED ORAL at 10:11

## 2020-11-29 RX ADMIN — LAMOTRIGINE 200 MG: 100 TABLET ORAL at 10:11

## 2020-11-29 NOTE — ED NOTES
Patient moved to room 16 for seizure. Pt is AAO x3. Disoriented to year. VSS. Seizure pads placed. Bed rails up x2. Suction at bedside.

## 2020-11-29 NOTE — ED NOTES
UPDATE  At the change of shift, notified by nursing staff that patient had decreased mental status after questionable seizure activity. Per patient chart, he does have a history of seizures and has several antiepileptic medication listed. On assessment, he appears post-ictal but in no distress. Vitals and CBG within normal limits. Patient moved to room 16 for closer monitoring. Spoke with patient's ACT , Willian, who was able to verify antiepileptics and doses. Medication list updated in Epic and morning doses of medication ordered.     Julissa Verde D.O  EMERGENCY MEDICINE   8:01 AM 11/29/2020         Julissa Verde, DO  12/02/20 1013

## 2020-11-30 VITALS
SYSTOLIC BLOOD PRESSURE: 111 MMHG | HEART RATE: 85 BPM | HEIGHT: 62 IN | RESPIRATION RATE: 16 BRPM | DIASTOLIC BLOOD PRESSURE: 66 MMHG | TEMPERATURE: 99 F | WEIGHT: 140 LBS | BODY MASS INDEX: 25.76 KG/M2 | OXYGEN SATURATION: 96 %

## 2020-11-30 PROCEDURE — 99202 OFFICE O/P NEW SF 15 MIN: CPT | Mod: 95,AF,HB, | Performed by: PSYCHIATRY & NEUROLOGY

## 2020-11-30 PROCEDURE — 25000003 PHARM REV CODE 250: Performed by: STUDENT IN AN ORGANIZED HEALTH CARE EDUCATION/TRAINING PROGRAM

## 2020-11-30 PROCEDURE — 99202 PR OFFICE/OUTPT VISIT, NEW, LEVL II, 15-29 MIN: ICD-10-PCS | Mod: 95,AF,HB, | Performed by: PSYCHIATRY & NEUROLOGY

## 2020-11-30 RX ORDER — LAMOTRIGINE 100 MG/1
200 TABLET ORAL 2 TIMES DAILY
Status: DISCONTINUED | OUTPATIENT
Start: 2020-11-30 | End: 2020-11-30 | Stop reason: HOSPADM

## 2020-11-30 RX ADMIN — LAMOTRIGINE 200 MG: 100 TABLET ORAL at 09:11

## 2020-11-30 RX ADMIN — OXCARBAZEPINE 450 MG: 150 TABLET, FILM COATED ORAL at 09:11

## 2020-11-30 RX ADMIN — LEVETIRACETAM 1000 MG: 500 TABLET ORAL at 09:11

## 2020-11-30 NOTE — ED NOTES
Pt lying in stretcher.  Resting comfortably.  Arouses to voice.  Nad.  Ask when he will go home.  Pec expires tonight
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
## 2020-12-02 LAB
LAMOTRIGINE SERPL-MCNC: 5.2 UG/ML (ref 2–15)
LEVETIRACETAM SERPL-MCNC: <1 UG/ML (ref 3–60)
OXCARBAZEPINE METABOLITE: 2 MCG/ML (ref 3–35)

## 2021-03-17 ENCOUNTER — HOSPITAL ENCOUNTER (EMERGENCY)
Facility: HOSPITAL | Age: 26
Discharge: PSYCHIATRIC OR SUBSTANCE USE FACILITY | End: 2021-03-18
Attending: EMERGENCY MEDICINE
Payer: MEDICAID

## 2021-03-17 DIAGNOSIS — R46.89 AGGRESSIVE BEHAVIOR: Primary | ICD-10-CM

## 2021-03-17 LAB
ALBUMIN SERPL BCP-MCNC: 4.5 G/DL (ref 3.5–5.2)
ALP SERPL-CCNC: 59 U/L (ref 55–135)
ALT SERPL W/O P-5'-P-CCNC: 15 U/L (ref 10–44)
AMPHET+METHAMPHET UR QL: NEGATIVE
ANION GAP SERPL CALC-SCNC: 9 MMOL/L (ref 8–16)
APAP SERPL-MCNC: <3 UG/ML (ref 10–20)
AST SERPL-CCNC: 18 U/L (ref 10–40)
BACTERIA #/AREA URNS HPF: NORMAL /HPF
BARBITURATES UR QL SCN>200 NG/ML: NEGATIVE
BASOPHILS # BLD AUTO: 0.02 K/UL (ref 0–0.2)
BASOPHILS NFR BLD: 0.4 % (ref 0–1.9)
BENZODIAZ UR QL SCN>200 NG/ML: NEGATIVE
BILIRUB SERPL-MCNC: 0.4 MG/DL (ref 0.1–1)
BILIRUB UR QL STRIP: NEGATIVE
BUN SERPL-MCNC: 18 MG/DL (ref 6–20)
BZE UR QL SCN: NEGATIVE
CALCIUM SERPL-MCNC: 9.5 MG/DL (ref 8.7–10.5)
CANNABINOIDS UR QL SCN: NEGATIVE
CHLORIDE SERPL-SCNC: 108 MMOL/L (ref 95–110)
CLARITY UR: CLEAR
CO2 SERPL-SCNC: 26 MMOL/L (ref 23–29)
COLOR UR: YELLOW
CREAT SERPL-MCNC: 1.1 MG/DL (ref 0.5–1.4)
CREAT UR-MCNC: 287.9 MG/DL (ref 23–375)
DIFFERENTIAL METHOD: ABNORMAL
EOSINOPHIL # BLD AUTO: 0 K/UL (ref 0–0.5)
EOSINOPHIL NFR BLD: 0.6 % (ref 0–8)
ERYTHROCYTE [DISTWIDTH] IN BLOOD BY AUTOMATED COUNT: 11.7 % (ref 11.5–14.5)
EST. GFR  (AFRICAN AMERICAN): >60 ML/MIN/1.73 M^2
EST. GFR  (NON AFRICAN AMERICAN): >60 ML/MIN/1.73 M^2
ETHANOL SERPL-MCNC: <10 MG/DL
GLUCOSE SERPL-MCNC: 92 MG/DL (ref 70–110)
GLUCOSE UR QL STRIP: NEGATIVE
HCT VFR BLD AUTO: 44.8 % (ref 40–54)
HGB BLD-MCNC: 15.6 G/DL (ref 14–18)
HGB UR QL STRIP: NEGATIVE
HYALINE CASTS #/AREA URNS LPF: 0 /LPF
IMM GRANULOCYTES # BLD AUTO: 0.01 K/UL (ref 0–0.04)
IMM GRANULOCYTES NFR BLD AUTO: 0.2 % (ref 0–0.5)
KETONES UR QL STRIP: NEGATIVE
LEUKOCYTE ESTERASE UR QL STRIP: NEGATIVE
LYMPHOCYTES # BLD AUTO: 1.3 K/UL (ref 1–4.8)
LYMPHOCYTES NFR BLD: 26.4 % (ref 18–48)
MCH RBC QN AUTO: 32.1 PG (ref 27–31)
MCHC RBC AUTO-ENTMCNC: 34.8 G/DL (ref 32–36)
MCV RBC AUTO: 92 FL (ref 82–98)
METHADONE UR QL SCN>300 NG/ML: NEGATIVE
MICROSCOPIC COMMENT: NORMAL
MONOCYTES # BLD AUTO: 0.5 K/UL (ref 0.3–1)
MONOCYTES NFR BLD: 10.5 % (ref 4–15)
NEUTROPHILS # BLD AUTO: 2.9 K/UL (ref 1.8–7.7)
NEUTROPHILS NFR BLD: 61.9 % (ref 38–73)
NITRITE UR QL STRIP: NEGATIVE
NRBC BLD-RTO: 0 /100 WBC
OPIATES UR QL SCN: NEGATIVE
PCP UR QL SCN>25 NG/ML: NEGATIVE
PH UR STRIP: 6 [PH] (ref 5–8)
PLATELET # BLD AUTO: 187 K/UL (ref 150–350)
PMV BLD AUTO: 9.3 FL (ref 9.2–12.9)
POTASSIUM SERPL-SCNC: 4 MMOL/L (ref 3.5–5.1)
PROT SERPL-MCNC: 7.8 G/DL (ref 6–8.4)
PROT UR QL STRIP: ABNORMAL
RBC # BLD AUTO: 4.86 M/UL (ref 4.6–6.2)
RBC #/AREA URNS HPF: 1 /HPF (ref 0–4)
SODIUM SERPL-SCNC: 143 MMOL/L (ref 136–145)
SP GR UR STRIP: >1.03 (ref 1–1.03)
TOXICOLOGY INFORMATION: NORMAL
TSH SERPL DL<=0.005 MIU/L-ACNC: 0.73 UIU/ML (ref 0.4–4)
URN SPEC COLLECT METH UR: ABNORMAL
UROBILINOGEN UR STRIP-ACNC: NEGATIVE EU/DL
WBC # BLD AUTO: 4.74 K/UL (ref 3.9–12.7)
WBC #/AREA URNS HPF: 3 /HPF (ref 0–5)

## 2021-03-17 PROCEDURE — 99285 EMERGENCY DEPT VISIT HI MDM: CPT

## 2021-03-17 PROCEDURE — 85025 COMPLETE CBC W/AUTO DIFF WBC: CPT | Performed by: EMERGENCY MEDICINE

## 2021-03-17 PROCEDURE — 80183 DRUG SCRN QUANT OXCARBAZEPIN: CPT | Performed by: EMERGENCY MEDICINE

## 2021-03-17 PROCEDURE — 82077 ASSAY SPEC XCP UR&BREATH IA: CPT | Performed by: EMERGENCY MEDICINE

## 2021-03-17 PROCEDURE — 80175 DRUG SCREEN QUAN LAMOTRIGINE: CPT | Performed by: EMERGENCY MEDICINE

## 2021-03-17 PROCEDURE — 84443 ASSAY THYROID STIM HORMONE: CPT | Performed by: EMERGENCY MEDICINE

## 2021-03-17 PROCEDURE — 81000 URINALYSIS NONAUTO W/SCOPE: CPT | Mod: 59 | Performed by: EMERGENCY MEDICINE

## 2021-03-17 PROCEDURE — 80307 DRUG TEST PRSMV CHEM ANLYZR: CPT | Performed by: EMERGENCY MEDICINE

## 2021-03-17 PROCEDURE — 80143 DRUG ASSAY ACETAMINOPHEN: CPT | Performed by: EMERGENCY MEDICINE

## 2021-03-17 PROCEDURE — 80053 COMPREHEN METABOLIC PANEL: CPT | Performed by: EMERGENCY MEDICINE

## 2021-03-18 VITALS
SYSTOLIC BLOOD PRESSURE: 115 MMHG | DIASTOLIC BLOOD PRESSURE: 71 MMHG | HEIGHT: 62 IN | BODY MASS INDEX: 24.84 KG/M2 | HEART RATE: 71 BPM | RESPIRATION RATE: 18 BRPM | OXYGEN SATURATION: 98 % | TEMPERATURE: 98 F | WEIGHT: 135 LBS

## 2021-03-18 PROBLEM — R46.89 AGGRESSIVE BEHAVIOR: Status: ACTIVE | Noted: 2021-03-18

## 2021-03-19 PROBLEM — Z13.9 ENCOUNTER FOR MEDICAL SCREENING EXAMINATION: Status: ACTIVE | Noted: 2021-03-19

## 2021-03-20 LAB — OXCARBAZEPINE METABOLITE: 19 MCG/ML (ref 10–35)

## 2021-03-22 PROBLEM — R46.89 AGGRESSIVE BEHAVIOR: Status: RESOLVED | Noted: 2021-03-18 | Resolved: 2021-03-22

## 2021-03-22 LAB — LAMOTRIGINE SERPL-MCNC: 7.3 UG/ML (ref 2–15)

## 2021-05-27 ENCOUNTER — OFFICE VISIT (OUTPATIENT)
Dept: INTERNAL MEDICINE | Facility: CLINIC | Age: 26
End: 2021-05-27
Payer: MEDICAID

## 2021-05-27 VITALS
HEIGHT: 62 IN | SYSTOLIC BLOOD PRESSURE: 109 MMHG | BODY MASS INDEX: 26.33 KG/M2 | WEIGHT: 143.06 LBS | HEART RATE: 97 BPM | TEMPERATURE: 98 F | DIASTOLIC BLOOD PRESSURE: 71 MMHG

## 2021-05-27 DIAGNOSIS — R46.89 AGGRESSION AGGRAVATED: ICD-10-CM

## 2021-05-27 DIAGNOSIS — F41.3 OTHER MIXED ANXIETY DISORDERS: ICD-10-CM

## 2021-05-27 DIAGNOSIS — F33.41 RECURRENT MAJOR DEPRESSIVE DISORDER, IN PARTIAL REMISSION: ICD-10-CM

## 2021-05-27 DIAGNOSIS — E78.00 PURE HYPERCHOLESTEROLEMIA: ICD-10-CM

## 2021-05-27 DIAGNOSIS — F71 MODERATE INTELLECTUAL DISABILITY WITH INTELLIGENCE QUOTIENT 35 TO 49: ICD-10-CM

## 2021-05-27 DIAGNOSIS — R73.03 PRE-DIABETES: ICD-10-CM

## 2021-05-27 DIAGNOSIS — F90.2 ATTENTION DEFICIT HYPERACTIVITY DISORDER (ADHD), COMBINED TYPE: ICD-10-CM

## 2021-05-27 DIAGNOSIS — Z00.01 ENCOUNTER FOR GENERAL ADULT MEDICAL EXAMINATION WITH ABNORMAL FINDINGS: Primary | ICD-10-CM

## 2021-05-27 DIAGNOSIS — G40.201 PARTIAL SYMPTOMATIC EPILEPSY WITH COMPLEX PARTIAL SEIZURES, NOT INTRACTABLE, WITH STATUS EPILEPTICUS: ICD-10-CM

## 2021-05-27 DIAGNOSIS — F89 DEVELOPMENTAL DISORDER: ICD-10-CM

## 2021-05-27 DIAGNOSIS — G40.309 GENERALIZED CONVULSIVE EPILEPSY: ICD-10-CM

## 2021-05-27 PROBLEM — F41.9 ANXIETY DISORDER: Status: ACTIVE | Noted: 2021-05-27

## 2021-05-27 PROCEDURE — 99395 PR PREVENTIVE VISIT,EST,18-39: ICD-10-PCS | Mod: S$GLB,,, | Performed by: INTERNAL MEDICINE

## 2021-05-27 PROCEDURE — 99395 PREV VISIT EST AGE 18-39: CPT | Mod: S$GLB,,, | Performed by: INTERNAL MEDICINE

## 2021-05-27 RX ORDER — QUETIAPINE FUMARATE 50 MG/1
50 TABLET, FILM COATED ORAL EVERY MORNING
COMMUNITY
Start: 2021-05-04 | End: 2022-09-13

## 2021-05-27 RX ORDER — OXCARBAZEPINE 300 MG/1
300 TABLET, FILM COATED ORAL 2 TIMES DAILY
COMMUNITY
Start: 2021-05-12

## 2021-05-27 RX ORDER — QUETIAPINE FUMARATE 300 MG/1
300 TABLET, FILM COATED ORAL NIGHTLY
COMMUNITY
Start: 2021-05-04 | End: 2022-03-17

## 2021-05-27 RX ORDER — ESCITALOPRAM OXALATE 20 MG/1
20 TABLET ORAL EVERY MORNING
COMMUNITY
Start: 2021-05-12

## 2021-05-27 RX ORDER — LEVETIRACETAM 1000 MG/1
1000 TABLET ORAL 2 TIMES DAILY
COMMUNITY
Start: 2021-05-05

## 2021-05-27 RX ORDER — OXCARBAZEPINE 150 MG/1
150 TABLET, FILM COATED ORAL 2 TIMES DAILY
COMMUNITY
Start: 2021-05-12

## 2021-05-27 RX ORDER — LAMOTRIGINE 200 MG/1
200 TABLET ORAL 2 TIMES DAILY
COMMUNITY
Start: 2021-05-22

## 2021-05-28 ENCOUNTER — TELEPHONE (OUTPATIENT)
Dept: FAMILY MEDICINE | Facility: CLINIC | Age: 26
End: 2021-05-28

## 2021-05-28 ENCOUNTER — PATIENT OUTREACH (OUTPATIENT)
Dept: ADMINISTRATIVE | Facility: HOSPITAL | Age: 26
End: 2021-05-28

## 2021-06-02 LAB
ALBUMIN SERPL-MCNC: 5 G/DL (ref 4.1–5.2)
ALBUMIN/GLOB SERPL: 1.7 {RATIO} (ref 1.2–2.2)
ALP SERPL-CCNC: 63 IU/L (ref 48–121)
ALT SERPL-CCNC: 19 IU/L (ref 0–44)
AST SERPL-CCNC: 18 IU/L (ref 0–40)
BASOPHILS # BLD AUTO: 0 X10E3/UL (ref 0–0.2)
BASOPHILS NFR BLD AUTO: 0 %
BILIRUB SERPL-MCNC: 0.3 MG/DL (ref 0–1.2)
BUN SERPL-MCNC: 13 MG/DL (ref 6–20)
BUN/CREAT SERPL: 11 (ref 9–20)
CALCIUM SERPL-MCNC: 10 MG/DL (ref 8.7–10.2)
CHLORIDE SERPL-SCNC: 101 MMOL/L (ref 96–106)
CHOLEST SERPL-MCNC: 212 MG/DL (ref 100–199)
CO2 SERPL-SCNC: 28 MMOL/L (ref 20–29)
CREAT SERPL-MCNC: 1.15 MG/DL (ref 0.76–1.27)
EOSINOPHIL # BLD AUTO: 0 X10E3/UL (ref 0–0.4)
EOSINOPHIL NFR BLD AUTO: 0 %
ERYTHROCYTE [DISTWIDTH] IN BLOOD BY AUTOMATED COUNT: 12.8 % (ref 11.6–15.4)
GLOBULIN SER CALC-MCNC: 3 G/DL (ref 1.5–4.5)
GLUCOSE SERPL-MCNC: 100 MG/DL (ref 65–99)
HBA1C MFR BLD: 5.2 % (ref 4.8–5.6)
HCT VFR BLD AUTO: 44.8 % (ref 37.5–51)
HDLC SERPL-MCNC: 47 MG/DL
HGB BLD-MCNC: 15.4 G/DL (ref 13–17.7)
IMM GRANULOCYTES # BLD AUTO: 0 X10E3/UL (ref 0–0.1)
IMM GRANULOCYTES NFR BLD AUTO: 0 %
IMP & REVIEW OF LAB RESULTS: NORMAL
LDLC SERPL CALC-MCNC: 142 MG/DL (ref 0–99)
LYMPHOCYTES # BLD AUTO: 1.8 X10E3/UL (ref 0.7–3.1)
LYMPHOCYTES NFR BLD AUTO: 51 %
MCH RBC QN AUTO: 32.6 PG (ref 26.6–33)
MCHC RBC AUTO-ENTMCNC: 34.4 G/DL (ref 31.5–35.7)
MCV RBC AUTO: 95 FL (ref 79–97)
MONOCYTES # BLD AUTO: 0.5 X10E3/UL (ref 0.1–0.9)
MONOCYTES NFR BLD AUTO: 14 %
NEUTROPHILS # BLD AUTO: 1.3 X10E3/UL (ref 1.4–7)
NEUTROPHILS NFR BLD AUTO: 35 %
PLATELET # BLD AUTO: 166 X10E3/UL (ref 150–450)
POTASSIUM SERPL-SCNC: 4.4 MMOL/L (ref 3.5–5.2)
PROT SERPL-MCNC: 8 G/DL (ref 6–8.5)
RBC # BLD AUTO: 4.72 X10E6/UL (ref 4.14–5.8)
SODIUM SERPL-SCNC: 142 MMOL/L (ref 134–144)
TESTOST SERPL-MCNC: 437 NG/DL (ref 264–916)
TRIGL SERPL-MCNC: 127 MG/DL (ref 0–149)
TSH SERPL DL<=0.005 MIU/L-ACNC: 2.34 UIU/ML (ref 0.45–4.5)
VLDLC SERPL CALC-MCNC: 23 MG/DL (ref 5–40)
WBC # BLD AUTO: 3.6 X10E3/UL (ref 3.4–10.8)

## 2021-06-10 ENCOUNTER — OFFICE VISIT (OUTPATIENT)
Dept: INTERNAL MEDICINE | Facility: CLINIC | Age: 26
End: 2021-06-10
Payer: MEDICAID

## 2021-06-10 VITALS
DIASTOLIC BLOOD PRESSURE: 66 MMHG | WEIGHT: 143.44 LBS | SYSTOLIC BLOOD PRESSURE: 102 MMHG | HEIGHT: 62 IN | TEMPERATURE: 98 F | BODY MASS INDEX: 26.39 KG/M2 | HEART RATE: 90 BPM

## 2021-06-10 DIAGNOSIS — F33.41 RECURRENT MAJOR DEPRESSIVE DISORDER, IN PARTIAL REMISSION: ICD-10-CM

## 2021-06-10 DIAGNOSIS — E78.00 PURE HYPERCHOLESTEROLEMIA: ICD-10-CM

## 2021-06-10 DIAGNOSIS — R46.89 AGGRESSION AGGRAVATED: ICD-10-CM

## 2021-06-10 DIAGNOSIS — R73.03 PRE-DIABETES: ICD-10-CM

## 2021-06-10 DIAGNOSIS — F90.2 ATTENTION DEFICIT HYPERACTIVITY DISORDER (ADHD), COMBINED TYPE: ICD-10-CM

## 2021-06-10 DIAGNOSIS — F71 MODERATE INTELLECTUAL DISABILITY WITH INTELLIGENCE QUOTIENT 35 TO 49: ICD-10-CM

## 2021-06-10 DIAGNOSIS — M41.9 SCOLIOSIS, UNSPECIFIED SCOLIOSIS TYPE, UNSPECIFIED SPINAL REGION: ICD-10-CM

## 2021-06-10 DIAGNOSIS — F89 DEVELOPMENTAL DISORDER: ICD-10-CM

## 2021-06-10 DIAGNOSIS — K06.1 GINGIVAL HYPERPLASIA: ICD-10-CM

## 2021-06-10 DIAGNOSIS — G40.309 GENERALIZED CONVULSIVE EPILEPSY: ICD-10-CM

## 2021-06-10 DIAGNOSIS — F84.0 AUTISM SPECTRUM DISORDER WITH ACCOMPANYING INTELLECTUAL IMPAIRMENT, REQUIRING VERY SUBSTANTIAL SUPPORT (LEVEL 3): Primary | ICD-10-CM

## 2021-06-10 DIAGNOSIS — F41.3 OTHER MIXED ANXIETY DISORDERS: ICD-10-CM

## 2021-06-10 DIAGNOSIS — G40.201 PARTIAL SYMPTOMATIC EPILEPSY WITH COMPLEX PARTIAL SEIZURES, NOT INTRACTABLE, WITH STATUS EPILEPTICUS: ICD-10-CM

## 2021-06-10 PROCEDURE — 99214 OFFICE O/P EST MOD 30 MIN: CPT | Mod: S$GLB,,, | Performed by: INTERNAL MEDICINE

## 2021-06-10 PROCEDURE — 99214 PR OFFICE/OUTPT VISIT, EST, LEVL IV, 30-39 MIN: ICD-10-PCS | Mod: S$GLB,,, | Performed by: INTERNAL MEDICINE

## 2021-06-10 RX ORDER — ATORVASTATIN CALCIUM 20 MG/1
20 TABLET, FILM COATED ORAL DAILY
Qty: 90 TABLET | Refills: 3 | Status: SHIPPED | OUTPATIENT
Start: 2021-06-10 | End: 2022-05-26

## 2021-06-10 RX ORDER — CHOLECALCIFEROL (VITAMIN D3) 50 MCG
2000 TABLET ORAL DAILY
Start: 2021-06-10

## 2021-08-26 ENCOUNTER — HOSPITAL ENCOUNTER (EMERGENCY)
Facility: HOSPITAL | Age: 26
Discharge: HOME | End: 2021-08-27
Attending: EMERGENCY MEDICINE
Payer: MEDICAID

## 2021-08-26 DIAGNOSIS — R46.89 AGGRESSIVE BEHAVIOR: Primary | ICD-10-CM

## 2021-08-26 LAB
APAP SERPL-MCNC: <3 UG/ML (ref 10–20)
BASOPHILS # BLD AUTO: 0.01 K/UL (ref 0–0.2)
BASOPHILS NFR BLD: 0.1 % (ref 0–1.9)
CTP QC/QA: YES
DIFFERENTIAL METHOD: ABNORMAL
EOSINOPHIL # BLD AUTO: 0 K/UL (ref 0–0.5)
EOSINOPHIL NFR BLD: 0 % (ref 0–8)
ERYTHROCYTE [DISTWIDTH] IN BLOOD BY AUTOMATED COUNT: 11.8 % (ref 11.5–14.5)
ETHANOL SERPL-MCNC: <10 MG/DL
HCT VFR BLD AUTO: 40.3 % (ref 40–54)
HGB BLD-MCNC: 14.2 G/DL (ref 14–18)
IMM GRANULOCYTES # BLD AUTO: 0.03 K/UL (ref 0–0.04)
IMM GRANULOCYTES NFR BLD AUTO: 0.4 % (ref 0–0.5)
LYMPHOCYTES # BLD AUTO: 1.8 K/UL (ref 1–4.8)
LYMPHOCYTES NFR BLD: 21.2 % (ref 18–48)
MCH RBC QN AUTO: 31.8 PG (ref 27–31)
MCHC RBC AUTO-ENTMCNC: 35.2 G/DL (ref 32–36)
MCV RBC AUTO: 90 FL (ref 82–98)
MONOCYTES # BLD AUTO: 0.7 K/UL (ref 0.3–1)
MONOCYTES NFR BLD: 8.6 % (ref 4–15)
NEUTROPHILS # BLD AUTO: 5.9 K/UL (ref 1.8–7.7)
NEUTROPHILS NFR BLD: 69.7 % (ref 38–73)
NRBC BLD-RTO: 0 /100 WBC
PLATELET # BLD AUTO: 169 K/UL (ref 150–450)
PMV BLD AUTO: 9.3 FL (ref 9.2–12.9)
RBC # BLD AUTO: 4.46 M/UL (ref 4.6–6.2)
SARS-COV-2 RDRP RESP QL NAA+PROBE: NEGATIVE
WBC # BLD AUTO: 8.5 K/UL (ref 3.9–12.7)

## 2021-08-26 PROCEDURE — 99285 EMERGENCY DEPT VISIT HI MDM: CPT | Mod: 25

## 2021-08-26 PROCEDURE — 82077 ASSAY SPEC XCP UR&BREATH IA: CPT | Performed by: PHYSICIAN ASSISTANT

## 2021-08-26 PROCEDURE — 99285 EMERGENCY DEPT VISIT HI MDM: CPT | Mod: CS,,, | Performed by: EMERGENCY MEDICINE

## 2021-08-26 PROCEDURE — U0002 COVID-19 LAB TEST NON-CDC: HCPCS | Performed by: PHYSICIAN ASSISTANT

## 2021-08-26 PROCEDURE — 85025 COMPLETE CBC W/AUTO DIFF WBC: CPT | Performed by: PHYSICIAN ASSISTANT

## 2021-08-26 PROCEDURE — 80143 DRUG ASSAY ACETAMINOPHEN: CPT | Performed by: PHYSICIAN ASSISTANT

## 2021-08-26 PROCEDURE — 80053 COMPREHEN METABOLIC PANEL: CPT | Performed by: PHYSICIAN ASSISTANT

## 2021-08-26 PROCEDURE — 99285 PR EMERGENCY DEPT VISIT,LEVEL V: ICD-10-PCS | Mod: CS,,, | Performed by: EMERGENCY MEDICINE

## 2021-08-26 PROCEDURE — 84443 ASSAY THYROID STIM HORMONE: CPT | Performed by: PHYSICIAN ASSISTANT

## 2021-08-26 PROCEDURE — 87389 HIV-1 AG W/HIV-1&-2 AB AG IA: CPT | Performed by: EMERGENCY MEDICINE

## 2021-08-26 PROCEDURE — 86803 HEPATITIS C AB TEST: CPT | Performed by: EMERGENCY MEDICINE

## 2021-08-27 VITALS
SYSTOLIC BLOOD PRESSURE: 100 MMHG | RESPIRATION RATE: 17 BRPM | TEMPERATURE: 99 F | WEIGHT: 143 LBS | HEIGHT: 62 IN | OXYGEN SATURATION: 97 % | HEART RATE: 84 BPM | DIASTOLIC BLOOD PRESSURE: 55 MMHG | BODY MASS INDEX: 26.31 KG/M2

## 2021-08-27 LAB
ALBUMIN SERPL BCP-MCNC: 4.3 G/DL (ref 3.5–5.2)
ALP SERPL-CCNC: 61 U/L (ref 55–135)
ALT SERPL W/O P-5'-P-CCNC: 24 U/L (ref 10–44)
AMPHET+METHAMPHET UR QL: NEGATIVE
ANION GAP SERPL CALC-SCNC: 14 MMOL/L (ref 8–16)
AST SERPL-CCNC: 24 U/L (ref 10–40)
BARBITURATES UR QL SCN>200 NG/ML: NEGATIVE
BENZODIAZ UR QL SCN>200 NG/ML: NEGATIVE
BILIRUB SERPL-MCNC: 0.4 MG/DL (ref 0.1–1)
BILIRUB UR QL STRIP: NEGATIVE
BUN SERPL-MCNC: 10 MG/DL (ref 6–30)
BUN SERPL-MCNC: 11 MG/DL (ref 6–20)
BZE UR QL SCN: NEGATIVE
CALCIUM SERPL-MCNC: 9.6 MG/DL (ref 8.7–10.5)
CANNABINOIDS UR QL SCN: NEGATIVE
CHLORIDE SERPL-SCNC: 102 MMOL/L (ref 95–110)
CHLORIDE SERPL-SCNC: 106 MMOL/L (ref 95–110)
CLARITY UR REFRACT.AUTO: CLEAR
CO2 SERPL-SCNC: 21 MMOL/L (ref 23–29)
COLOR UR AUTO: YELLOW
CREAT SERPL-MCNC: 0.9 MG/DL (ref 0.5–1.4)
CREAT SERPL-MCNC: 0.9 MG/DL (ref 0.5–1.4)
CREAT UR-MCNC: 170 MG/DL (ref 23–375)
EST. GFR  (AFRICAN AMERICAN): >60 ML/MIN/1.73 M^2
EST. GFR  (NON AFRICAN AMERICAN): >60 ML/MIN/1.73 M^2
GLUCOSE SERPL-MCNC: 89 MG/DL (ref 70–110)
GLUCOSE SERPL-MCNC: 96 MG/DL (ref 70–110)
GLUCOSE UR QL STRIP: NEGATIVE
HCT VFR BLD CALC: 40 %PCV (ref 36–54)
HCV AB SERPL QL IA: NEGATIVE
HGB UR QL STRIP: NEGATIVE
HIV 1+2 AB+HIV1 P24 AG SERPL QL IA: NEGATIVE
KETONES UR QL STRIP: NEGATIVE
LEUKOCYTE ESTERASE UR QL STRIP: NEGATIVE
METHADONE UR QL SCN>300 NG/ML: NEGATIVE
MICROSCOPIC COMMENT: NORMAL
NITRITE UR QL STRIP: NEGATIVE
OPIATES UR QL SCN: NEGATIVE
PCP UR QL SCN>25 NG/ML: NEGATIVE
PH UR STRIP: 6 [PH] (ref 5–8)
POC IONIZED CALCIUM: 1.15 MMOL/L (ref 1.06–1.42)
POC TCO2 (MEASURED): 31 MMOL/L (ref 23–29)
POTASSIUM BLD-SCNC: 3.4 MMOL/L (ref 3.5–5.1)
POTASSIUM SERPL-SCNC: 3.5 MMOL/L (ref 3.5–5.1)
PROT SERPL-MCNC: 7.5 G/DL (ref 6–8.4)
PROT UR QL STRIP: NEGATIVE
RBC #/AREA URNS AUTO: 0 /HPF (ref 0–4)
SAMPLE: ABNORMAL
SODIUM BLD-SCNC: 143 MMOL/L (ref 136–145)
SODIUM SERPL-SCNC: 141 MMOL/L (ref 136–145)
SP GR UR STRIP: 1.02 (ref 1–1.03)
SQUAMOUS #/AREA URNS AUTO: 0 /HPF
TOXICOLOGY INFORMATION: NORMAL
TSH SERPL DL<=0.005 MIU/L-ACNC: 2 UIU/ML (ref 0.4–4)
URN SPEC COLLECT METH UR: NORMAL
WBC #/AREA URNS AUTO: 1 /HPF (ref 0–5)

## 2021-08-27 PROCEDURE — 25000003 PHARM REV CODE 250: Performed by: EMERGENCY MEDICINE

## 2021-08-27 PROCEDURE — 81001 URINALYSIS AUTO W/SCOPE: CPT | Performed by: PHYSICIAN ASSISTANT

## 2021-08-27 PROCEDURE — 80307 DRUG TEST PRSMV CHEM ANLYZR: CPT | Performed by: PHYSICIAN ASSISTANT

## 2021-08-27 RX ORDER — LAMOTRIGINE 100 MG/1
200 TABLET ORAL 2 TIMES DAILY
Status: DISCONTINUED | OUTPATIENT
Start: 2021-08-27 | End: 2021-08-27 | Stop reason: HOSPADM

## 2021-08-27 RX ORDER — QUETIAPINE FUMARATE 25 MG/1
50 TABLET, FILM COATED ORAL DAILY
Status: DISCONTINUED | OUTPATIENT
Start: 2021-08-27 | End: 2021-08-27 | Stop reason: HOSPADM

## 2021-08-27 RX ORDER — LEVETIRACETAM 500 MG/1
1000 TABLET ORAL 2 TIMES DAILY
Status: DISCONTINUED | OUTPATIENT
Start: 2021-08-27 | End: 2021-08-27 | Stop reason: HOSPADM

## 2021-08-27 RX ORDER — QUETIAPINE FUMARATE 300 MG/1
300 TABLET, FILM COATED ORAL NIGHTLY
Status: DISCONTINUED | OUTPATIENT
Start: 2021-08-27 | End: 2021-08-27 | Stop reason: HOSPADM

## 2021-08-27 RX ORDER — OXCARBAZEPINE 150 MG/1
150 TABLET, FILM COATED ORAL 2 TIMES DAILY
Status: DISCONTINUED | OUTPATIENT
Start: 2021-08-27 | End: 2021-08-27 | Stop reason: HOSPADM

## 2021-08-27 RX ADMIN — LEVETIRACETAM 1000 MG: 500 TABLET ORAL at 03:08

## 2021-08-27 RX ADMIN — LAMOTRIGINE 200 MG: 100 TABLET ORAL at 08:08

## 2021-08-27 RX ADMIN — LEVETIRACETAM 1000 MG: 500 TABLET ORAL at 08:08

## 2021-08-27 RX ADMIN — QUETIAPINE FUMARATE 50 MG: 25 TABLET ORAL at 08:08

## 2021-08-27 RX ADMIN — OXCARBAZEPINE 150 MG: 150 TABLET, FILM COATED ORAL at 03:08

## 2021-08-27 RX ADMIN — OXCARBAZEPINE 150 MG: 150 TABLET, FILM COATED ORAL at 08:08

## 2021-08-27 RX ADMIN — QUETIAPINE FUMARATE 300 MG: 300 TABLET ORAL at 03:08

## 2021-08-30 PROBLEM — Z00.01 ENCOUNTER FOR GENERAL ADULT MEDICAL EXAMINATION WITH ABNORMAL FINDINGS: Status: RESOLVED | Noted: 2021-05-27 | Resolved: 2021-08-30

## 2021-09-14 ENCOUNTER — OFFICE VISIT (OUTPATIENT)
Dept: INTERNAL MEDICINE | Facility: CLINIC | Age: 26
End: 2021-09-14
Payer: MEDICAID

## 2021-09-14 VITALS
BODY MASS INDEX: 27.41 KG/M2 | HEIGHT: 62 IN | SYSTOLIC BLOOD PRESSURE: 111 MMHG | WEIGHT: 148.94 LBS | DIASTOLIC BLOOD PRESSURE: 66 MMHG | TEMPERATURE: 98 F | HEART RATE: 97 BPM

## 2021-09-14 DIAGNOSIS — F71 MODERATE INTELLECTUAL DISABILITY WITH INTELLIGENCE QUOTIENT 35 TO 49: ICD-10-CM

## 2021-09-14 DIAGNOSIS — F84.0 AUTISM SPECTRUM DISORDER WITH ACCOMPANYING INTELLECTUAL IMPAIRMENT, REQUIRING VERY SUBSTANTIAL SUPPORT (LEVEL 3): Primary | ICD-10-CM

## 2021-09-14 DIAGNOSIS — F41.3 OTHER MIXED ANXIETY DISORDERS: ICD-10-CM

## 2021-09-14 DIAGNOSIS — E78.00 PURE HYPERCHOLESTEROLEMIA: ICD-10-CM

## 2021-09-14 DIAGNOSIS — K06.1 GINGIVAL HYPERPLASIA: ICD-10-CM

## 2021-09-14 DIAGNOSIS — R73.03 PRE-DIABETES: ICD-10-CM

## 2021-09-14 DIAGNOSIS — F33.41 RECURRENT MAJOR DEPRESSIVE DISORDER, IN PARTIAL REMISSION: ICD-10-CM

## 2021-09-14 DIAGNOSIS — F89 DEVELOPMENTAL DISORDER: ICD-10-CM

## 2021-09-14 DIAGNOSIS — F90.2 ATTENTION DEFICIT HYPERACTIVITY DISORDER (ADHD), COMBINED TYPE: ICD-10-CM

## 2021-09-14 DIAGNOSIS — R46.89 AGGRESSION AGGRAVATED: ICD-10-CM

## 2021-09-14 DIAGNOSIS — G40.309 GENERALIZED CONVULSIVE EPILEPSY: ICD-10-CM

## 2021-09-14 PROCEDURE — 99214 OFFICE O/P EST MOD 30 MIN: CPT | Mod: S$GLB,,, | Performed by: INTERNAL MEDICINE

## 2021-09-14 PROCEDURE — 99214 PR OFFICE/OUTPT VISIT, EST, LEVL IV, 30-39 MIN: ICD-10-PCS | Mod: S$GLB,,, | Performed by: INTERNAL MEDICINE

## 2021-09-14 RX ORDER — QUETIAPINE FUMARATE 100 MG/1
100 TABLET, FILM COATED ORAL DAILY PRN
COMMUNITY
Start: 2021-09-08 | End: 2021-09-14 | Stop reason: SDUPTHER

## 2021-09-14 RX ORDER — QUETIAPINE FUMARATE 100 MG/1
100 TABLET, FILM COATED ORAL DAILY PRN
Qty: 30 TABLET | Refills: 1 | Status: SHIPPED | OUTPATIENT
Start: 2021-09-14 | End: 2022-09-13

## 2021-09-14 RX ORDER — FLASH GLUCOSE SCANNING READER
EACH MISCELLANEOUS
Qty: 1 EACH | Refills: 0 | Status: SHIPPED | OUTPATIENT
Start: 2021-09-14 | End: 2021-09-14

## 2021-12-02 ENCOUNTER — CLINICAL SUPPORT (OUTPATIENT)
Dept: INTERNAL MEDICINE | Facility: CLINIC | Age: 26
End: 2021-12-02
Payer: MEDICARE

## 2021-12-02 DIAGNOSIS — Z23 NEED FOR INFLUENZA VACCINATION: Primary | ICD-10-CM

## 2021-12-02 PROCEDURE — 90686 FLU VACCINE (QUAD) GREATER THAN OR EQUAL TO 3YO PRESERVATIVE FREE IM: ICD-10-PCS | Mod: S$GLB,,, | Performed by: INTERNAL MEDICINE

## 2021-12-02 PROCEDURE — 90686 IIV4 VACC NO PRSV 0.5 ML IM: CPT | Mod: S$GLB,,, | Performed by: INTERNAL MEDICINE

## 2021-12-02 PROCEDURE — G0008 ADMIN INFLUENZA VIRUS VAC: HCPCS | Mod: S$GLB,,, | Performed by: INTERNAL MEDICINE

## 2021-12-02 PROCEDURE — G0008 FLU VACCINE (QUAD) GREATER THAN OR EQUAL TO 3YO PRESERVATIVE FREE IM: ICD-10-PCS | Mod: S$GLB,,, | Performed by: INTERNAL MEDICINE

## 2022-03-09 LAB
ALBUMIN SERPL-MCNC: 5 G/DL (ref 4.1–5.2)
ALBUMIN/GLOB SERPL: 1.9 {RATIO} (ref 1.2–2.2)
ALP SERPL-CCNC: 73 IU/L (ref 44–121)
ALT SERPL-CCNC: 27 IU/L (ref 0–44)
AST SERPL-CCNC: 24 IU/L (ref 0–40)
BASOPHILS # BLD AUTO: 0 X10E3/UL (ref 0–0.2)
BASOPHILS NFR BLD AUTO: 0 %
BILIRUB SERPL-MCNC: 0.2 MG/DL (ref 0–1.2)
BUN SERPL-MCNC: 11 MG/DL (ref 6–20)
BUN/CREAT SERPL: 10 (ref 9–20)
CALCIUM SERPL-MCNC: 9.5 MG/DL (ref 8.7–10.2)
CHLORIDE SERPL-SCNC: 104 MMOL/L (ref 96–106)
CHOLEST SERPL-MCNC: 153 MG/DL (ref 100–199)
CO2 SERPL-SCNC: 22 MMOL/L (ref 20–29)
CREAT SERPL-MCNC: 1.1 MG/DL (ref 0.76–1.27)
EOSINOPHIL # BLD AUTO: 0 X10E3/UL (ref 0–0.4)
EOSINOPHIL NFR BLD AUTO: 0 %
ERYTHROCYTE [DISTWIDTH] IN BLOOD BY AUTOMATED COUNT: 12.8 % (ref 11.6–15.4)
EST. GFR  (NO RACE VARIABLE): 95 ML/MIN/1.73
GLOBULIN SER CALC-MCNC: 2.7 G/DL (ref 1.5–4.5)
GLUCOSE SERPL-MCNC: 101 MG/DL (ref 65–99)
HCT VFR BLD AUTO: 45.1 % (ref 37.5–51)
HDLC SERPL-MCNC: 38 MG/DL
HGB BLD-MCNC: 15 G/DL (ref 13–17.7)
IMM GRANULOCYTES # BLD AUTO: 0 X10E3/UL (ref 0–0.1)
IMM GRANULOCYTES NFR BLD AUTO: 0 %
IMP & REVIEW OF LAB RESULTS: NORMAL
LDLC SERPL CALC-MCNC: 87 MG/DL (ref 0–99)
LYMPHOCYTES # BLD AUTO: 1.6 X10E3/UL (ref 0.7–3.1)
LYMPHOCYTES NFR BLD AUTO: 35 %
MCH RBC QN AUTO: 31.6 PG (ref 26.6–33)
MCHC RBC AUTO-ENTMCNC: 33.3 G/DL (ref 31.5–35.7)
MCV RBC AUTO: 95 FL (ref 79–97)
MONOCYTES # BLD AUTO: 0.5 X10E3/UL (ref 0.1–0.9)
MONOCYTES NFR BLD AUTO: 10 %
NEUTROPHILS # BLD AUTO: 2.6 X10E3/UL (ref 1.4–7)
NEUTROPHILS NFR BLD AUTO: 55 %
PLATELET # BLD AUTO: 151 X10E3/UL (ref 150–450)
POTASSIUM SERPL-SCNC: 3.7 MMOL/L (ref 3.5–5.2)
PROT SERPL-MCNC: 7.7 G/DL (ref 6–8.5)
RBC # BLD AUTO: 4.75 X10E6/UL (ref 4.14–5.8)
SODIUM SERPL-SCNC: 142 MMOL/L (ref 134–144)
TRIGL SERPL-MCNC: 164 MG/DL (ref 0–149)
VLDLC SERPL CALC-MCNC: 28 MG/DL (ref 5–40)
WBC # BLD AUTO: 4.7 X10E3/UL (ref 3.4–10.8)

## 2022-03-17 ENCOUNTER — OFFICE VISIT (OUTPATIENT)
Dept: INTERNAL MEDICINE | Facility: CLINIC | Age: 27
End: 2022-03-17
Payer: MEDICARE

## 2022-03-17 VITALS
DIASTOLIC BLOOD PRESSURE: 68 MMHG | HEART RATE: 112 BPM | HEIGHT: 62 IN | WEIGHT: 165.56 LBS | TEMPERATURE: 98 F | BODY MASS INDEX: 30.47 KG/M2 | SYSTOLIC BLOOD PRESSURE: 109 MMHG

## 2022-03-17 DIAGNOSIS — G40.209 PARTIAL SYMPTOMATIC EPILEPSY WITH COMPLEX PARTIAL SEIZURES, NOT INTRACTABLE, WITHOUT STATUS EPILEPTICUS: ICD-10-CM

## 2022-03-17 DIAGNOSIS — G40.309 GENERALIZED CONVULSIVE EPILEPSY: ICD-10-CM

## 2022-03-17 DIAGNOSIS — Z00.01 ENCOUNTER FOR GENERAL ADULT MEDICAL EXAMINATION WITH ABNORMAL FINDINGS: Primary | ICD-10-CM

## 2022-03-17 DIAGNOSIS — F90.2 ATTENTION DEFICIT HYPERACTIVITY DISORDER (ADHD), COMBINED TYPE: ICD-10-CM

## 2022-03-17 DIAGNOSIS — F41.3 OTHER MIXED ANXIETY DISORDERS: ICD-10-CM

## 2022-03-17 DIAGNOSIS — F71 MODERATE INTELLECTUAL DISABILITY WITH INTELLIGENCE QUOTIENT 35 TO 49: ICD-10-CM

## 2022-03-17 DIAGNOSIS — R46.89 AGGRESSION AGGRAVATED: ICD-10-CM

## 2022-03-17 DIAGNOSIS — D72.819 LEUKOPENIA, UNSPECIFIED TYPE: ICD-10-CM

## 2022-03-17 DIAGNOSIS — E78.00 PURE HYPERCHOLESTEROLEMIA: ICD-10-CM

## 2022-03-17 DIAGNOSIS — F33.41 RECURRENT MAJOR DEPRESSIVE DISORDER, IN PARTIAL REMISSION: ICD-10-CM

## 2022-03-17 DIAGNOSIS — E66.09 CLASS 1 OBESITY DUE TO EXCESS CALORIES WITH SERIOUS COMORBIDITY AND BODY MASS INDEX (BMI) OF 30.0 TO 30.9 IN ADULT: ICD-10-CM

## 2022-03-17 DIAGNOSIS — K06.1 GINGIVAL HYPERPLASIA: ICD-10-CM

## 2022-03-17 DIAGNOSIS — F84.0 AUTISM SPECTRUM DISORDER WITH ACCOMPANYING INTELLECTUAL IMPAIRMENT, REQUIRING VERY SUBSTANTIAL SUPPORT (LEVEL 3): ICD-10-CM

## 2022-03-17 DIAGNOSIS — F89 DEVELOPMENTAL DISORDER: ICD-10-CM

## 2022-03-17 DIAGNOSIS — R73.03 PRE-DIABETES: ICD-10-CM

## 2022-03-17 PROCEDURE — 99213 OFFICE O/P EST LOW 20 MIN: CPT | Mod: S$GLB,,, | Performed by: INTERNAL MEDICINE

## 2022-03-17 PROCEDURE — 99213 PR OFFICE/OUTPT VISIT, EST, LEVL III, 20-29 MIN: ICD-10-PCS | Mod: S$GLB,,, | Performed by: INTERNAL MEDICINE

## 2022-03-17 RX ORDER — QUETIAPINE FUMARATE 200 MG/1
200 TABLET, FILM COATED ORAL 3 TIMES DAILY
COMMUNITY
Start: 2022-03-02 | End: 2023-03-09

## 2022-03-17 NOTE — PROGRESS NOTES
Chief C/o:    Hyperlipidemia, Anxiety, Seizures, and Developmental Delay (Pt.'s caretaker states the Pt. Has been hyperactive x 2 weeks.)        Health Care Maintenance    Health Maintenance       Date Due Completion Date    HPV Vaccines (1 - Male 2-dose series) Never done ---    TETANUS VACCINE 05/07/2030 5/7/2020                 HISTORY OF PRESENT ILLNESS:    HILDA Kent is a 27 y.o. male who presents to the clinic today for Hyperlipidemia, Anxiety, Seizures, and Developmental Delay (Pt.'s caretaker states the Pt. Has been hyperactive x 2 weeks.)  .  Patient is fairly stable, he is at baseline, he is seeing his psychiatrist and he is fairly well controlled with current medications.  Patient came with his sister as well as with a sitter.              ALLERGIES AND MEDICATIONS: updated and reviewed.  Review of patient's allergies indicates:   Allergen Reactions    Sulfa (sulfonamide antibiotics) Swelling     Medication List with Changes/Refills   Current Medications    ATORVASTATIN (LIPITOR) 20 MG TABLET    Take 1 tablet (20 mg total) by mouth once daily.    CHOLECALCIFEROL, VITAMIN D3, (VITAMIN D3) 50 MCG (2,000 UNIT) TAB    Take 1 tablet (2,000 Units total) by mouth once daily.    ESCITALOPRAM OXALATE (LEXAPRO) 20 MG TABLET    Take 20 mg by mouth every morning.    LAMOTRIGINE (LAMICTAL) 200 MG TABLET    Take 200 mg by mouth 2 (two) times daily.    LEVETIRACETAM (KEPPRA) 1000 MG TABLET    Take 1,000 mg by mouth 2 (two) times daily.    OXCARBAZEPINE (TRILEPTAL) 150 MG TAB    Take 150 mg by mouth 2 (two) times daily.    OXCARBAZEPINE (TRILEPTAL) 300 MG TAB    Take 300 mg by mouth 2 (two) times daily.    QUETIAPINE (SEROQUEL) 100 MG TAB    Take 1 tablet (100 mg total) by mouth daily as needed.    QUETIAPINE (SEROQUEL) 200 MG TAB    Take 200 mg by mouth 3 (three) times daily.    QUETIAPINE (SEROQUEL) 50 MG TABLET    Take 50 mg by mouth every morning.    XIIDRA 5 % DPET    USE IN EYE TWO TIMES A DAY AS  DIRECTED   Discontinued Medications    QUETIAPINE (SEROQUEL) 300 MG TAB    Take 300 mg by mouth nightly.       Problem List:  Patient Active Problem List   Diagnosis    Other idiopathic scoliosis, thoracolumbar region    Aggression aggravated    Nonintractable epilepsy with complex partial seizures    Generalized convulsive epilepsy    Attention deficit hyperactivity disorder (ADHD), combined type    Moderate intellectual disability with intelligence quotient 35 to 49    Autism spectrum disorder with accompanying intellectual impairment, requiring very substantial support (level 3)    Leukopenia    Pre-diabetes    Gingival hyperplasia    Developmental disorder    Pure hypercholesterolemia    Recurrent major depressive disorder, in partial remission    Other mixed anxiety disorders    Class 1 obesity due to excess calories with serious comorbidity and body mass index (BMI) of 30.0 to 30.9 in adult         CARE TEAM:    Patient Care Team:  Toan Dunn MD as PCP - General (Internal Medicine)  Raiza Arzola LPN as Licensed Practical Nurse         REVIEW OF SYSTEMS:    Review of Systems   Constitutional: Negative for appetite change, chills, diaphoresis, fatigue, fever and unexpected weight change.   HENT: Negative for congestion, drooling, ear discharge, ear pain, facial swelling, hearing loss, nosebleeds, rhinorrhea, sinus pain, sneezing, sore throat, tinnitus, trouble swallowing and voice change.    Eyes: Negative for pain, discharge, redness, itching and visual disturbance.   Respiratory: Negative for cough, choking, chest tightness, shortness of breath, wheezing and stridor.    Cardiovascular: Negative for chest pain, palpitations and leg swelling.   Gastrointestinal: Negative for abdominal distention, abdominal pain, blood in stool, constipation, diarrhea, nausea and vomiting.   Endocrine: Negative for cold intolerance, heat intolerance, polydipsia, polyphagia and polyuria.  "  Genitourinary: Negative for difficulty urinating, dysuria, flank pain, frequency, hematuria and urgency.   Musculoskeletal: Negative for arthralgias, back pain, gait problem, joint swelling, myalgias, neck pain and neck stiffness.   Skin: Negative for color change, pallor, rash and wound.   Allergic/Immunologic: Negative for environmental allergies, food allergies and immunocompromised state.   Neurological: Positive for weakness. Negative for dizziness, tremors, seizures, syncope, speech difficulty, light-headedness, numbness and headaches.        Intellectual and developmental impairment   Hematological: Negative for adenopathy. Does not bruise/bleed easily.   Psychiatric/Behavioral: Positive for agitation and confusion. Negative for behavioral problems, decreased concentration, dysphoric mood, hallucinations, sleep disturbance and suicidal ideas. The patient is not nervous/anxious.          PHYSICAL EXAM:    Vitals:    03/17/22 1050   BP: 109/68   Pulse: (!) 112   Temp: 97.9 °F (36.6 °C)     Weight: 75.1 kg (165 lb 9.1 oz)   Height: 5' 2" (157.5 cm)   Body mass index is 30.28 kg/m².  Vitals:    03/17/22 1050   BP: 109/68   Pulse: (!) 112   Temp: 97.9 °F (36.6 °C)   TempSrc: Temporal   Weight: 75.1 kg (165 lb 9.1 oz)   Height: 5' 2" (1.575 m)   PainSc: 0-No pain          Physical Exam  Vitals and nursing note reviewed.   Constitutional:       General: He is not in acute distress.     Appearance: He is obese. He is not ill-appearing, toxic-appearing or diaphoretic.      Comments: Patient is alert, awake and oriented X 3.  Patient is comfortable, cooperative and in no apparent distress.     HENT:      Head: Normocephalic and atraumatic.      Right Ear: Tympanic membrane, ear canal and external ear normal. There is no impacted cerumen.      Left Ear: Tympanic membrane, ear canal and external ear normal. There is no impacted cerumen.      Nose: Nose normal. No congestion or rhinorrhea.      Mouth/Throat:      Mouth: " Mucous membranes are moist.      Pharynx: Oropharynx is clear. No oropharyngeal exudate or posterior oropharyngeal erythema.      Comments: Gingival hyperplasia  Eyes:      General: No scleral icterus.        Right eye: No discharge.         Left eye: No discharge.      Extraocular Movements: Extraocular movements intact.      Conjunctiva/sclera: Conjunctivae normal.      Pupils: Pupils are equal, round, and reactive to light.   Cardiovascular:      Rate and Rhythm: Normal rate and regular rhythm.      Pulses: Normal pulses.      Heart sounds: Normal heart sounds. No murmur heard.    No friction rub. No gallop.   Pulmonary:      Effort: Pulmonary effort is normal. No respiratory distress.      Breath sounds: Normal breath sounds. No stridor. No wheezing, rhonchi or rales.   Chest:      Chest wall: No tenderness.   Abdominal:      General: Bowel sounds are normal. There is no distension.      Palpations: Abdomen is soft. There is no mass.      Tenderness: There is no abdominal tenderness. There is no guarding or rebound.      Hernia: No hernia is present.   Musculoskeletal:         General: No swelling, tenderness, deformity or signs of injury. Normal range of motion.      Cervical back: Normal range of motion and neck supple. No rigidity.      Right lower leg: No edema.      Left lower leg: No edema.   Lymphadenopathy:      Cervical: No cervical adenopathy.   Skin:     General: Skin is warm and dry.      Capillary Refill: Capillary refill takes less than 2 seconds.      Coloration: Skin is not jaundiced or pale.      Findings: No bruising, erythema, lesion or rash.   Neurological:      General: No focal deficit present.      Mental Status: He is alert.      Cranial Nerves: No cranial nerve deficit.      Sensory: No sensory deficit.      Motor: No weakness.      Coordination: Coordination normal.      Deep Tendon Reflexes: Reflexes normal.      Comments: Patient with intellectual delay.   Psychiatric:         Mood  and Affect: Mood normal.         Behavior: Behavior normal.         Thought Content: Thought content normal.         Judgment: Judgment normal.      __________________________________________________________  Questionnaires to be scanned to the media section of patient's EHR records:    PHQ-9.  STEPHAN-7.    Safety at home.  -------------------------------  Past medical history, Family history, Social history and Allergies were reviewed.      Labs:    Lab Results   Component Value Date     (H) 03/08/2022     03/08/2022    K 3.7 03/08/2022     03/08/2022    CO2 22 03/08/2022    BUN 11 03/08/2022    CREATININE 1.10 03/08/2022    CALCIUM 9.5 03/08/2022    PROT 7.5 08/26/2021    ALBUMIN 5.0 03/08/2022    ALBUMIN 4.3 08/26/2021    BILITOT 0.2 03/08/2022    ALKPHOS 61 08/26/2021    AST 24 03/08/2022    ALT 27 03/08/2022    ANIONGAP 14 08/26/2021    ESTGFRAFRICA >60.0 08/26/2021    EGFRNONAA >60.0 08/26/2021     Lab Results   Component Value Date    WBC 4.7 03/08/2022    WBC 8.50 08/26/2021    RBC 4.75 03/08/2022    RBC 4.46 (L) 08/26/2021    HGB 15.0 03/08/2022    HGB 14.2 08/26/2021    HCT 45.1 03/08/2022    HCT 40 08/27/2021    HCT 40.3 08/26/2021    MCV 95 03/08/2022    MCV 90 08/26/2021    RDW 12.8 03/08/2022    RDW 11.8 08/26/2021     03/08/2022     08/26/2021      Lab Results   Component Value Date    CHOL 153 03/08/2022    TRIG 164 (H) 03/08/2022    HDL 38 (L) 03/08/2022    LDLCALC 87 03/08/2022     Lab Results   Component Value Date    TSH 1.999 08/26/2021     Lab Results   Component Value Date    HGBA1C 5.2 05/28/2021      No components found for: MICROALBUMIN/CREATININE    ASSESSMENT & PLAN:    1. Encounter for general adult medical examination with abnormal findings    2. Pure hypercholesterolemia  - Lipid Panel; Future  - Hepatic Function Panel; Future  - Lipid Panel  - Hepatic Function Panel  The current medical regimen is effective;  continue present plan and medications.  3.  Pre-diabetes  Blood sugar is in the range of prediabetes.  In simple words, blood sugar is elevated more than the upper limit of normal, and less than that of Diabetes Mellitus.   Healthy diet, exercise and weight management are essential, to prevent or decrease chances of progression to f clinical Diabetes Mellitus.  4. Gingival hyperplasia  Secondary to seizure medications  5. Leukopenia, unspecified type  This labs showed a normal WBCs, will continue follow-up on intervals.  6. Class 1 obesity due to excess calories with serious comorbidity and body mass index (BMI) of 30.0 to 30.9 in adult  Healthy diet, exercise, and weight monitoring are essential for weight loss.   Low-fat diet, increase vegetables, learn how to count calories, check weight every morning and keep a diet and weight diary.  Bring the diary next visit.  Try to identify one specific food item or habit that you can improve, try to stick to it and add another item after 2-4 weeks interval.   If you do not see results, please bring your food diary and we will try, together, to find something specific that we can work on.    7. Developmental disorder  Patient continue follow-up with psychiatrist regarding his developmental disorder as well as seizures and psychiatric disorder.  8. Generalized convulsive epilepsy    9. Moderate intellectual disability with intelligence quotient 35 to 49    10. Partial symptomatic epilepsy with complex partial seizures, not intractable, without status epilepticus    11. Aggression aggravated    12. Attention deficit hyperactivity disorder (ADHD), combined type    13. Other mixed anxiety disorders    14. Recurrent major depressive disorder, in partial remission    15. Autism spectrum disorder with accompanying intellectual impairment, requiring very substantial support (level 3)     In general patient is fairly well controlled and safe in his environment.        Orders Placed This Encounter   Procedures    Lipid Panel     Hepatic Function Panel      Follow up in about 6 months (around 9/17/2022), or if symptoms worsen or fail to improve. or sooner as needed.    Patient was counseled and questions and concerns were addressed.    Please note:  Parts of this report were done using a dictation software, voice to text, and sometimes the text contains some uncorrected words or sentences that are missed during revision.

## 2022-04-18 ENCOUNTER — OFFICE VISIT (OUTPATIENT)
Dept: URGENT CARE | Facility: CLINIC | Age: 27
End: 2022-04-18
Payer: MEDICARE

## 2022-04-18 VITALS
BODY MASS INDEX: 31.28 KG/M2 | RESPIRATION RATE: 18 BRPM | WEIGHT: 170 LBS | HEIGHT: 62 IN | TEMPERATURE: 98 F | SYSTOLIC BLOOD PRESSURE: 123 MMHG | HEART RATE: 98 BPM | DIASTOLIC BLOOD PRESSURE: 84 MMHG | OXYGEN SATURATION: 95 %

## 2022-04-18 DIAGNOSIS — R05.9 COUGH: ICD-10-CM

## 2022-04-18 DIAGNOSIS — J30.89 ALLERGIC RHINITIS DUE TO OTHER ALLERGIC TRIGGER, UNSPECIFIED SEASONALITY: Primary | ICD-10-CM

## 2022-04-18 DIAGNOSIS — H10.33 ACUTE BACTERIAL CONJUNCTIVITIS OF BOTH EYES: ICD-10-CM

## 2022-04-18 LAB
CTP QC/QA: YES
SARS-COV-2 RDRP RESP QL NAA+PROBE: NEGATIVE

## 2022-04-18 PROCEDURE — U0002: ICD-10-PCS | Mod: QW,CR,S$GLB, | Performed by: NURSE PRACTITIONER

## 2022-04-18 PROCEDURE — 99203 OFFICE O/P NEW LOW 30 MIN: CPT | Mod: S$GLB,,, | Performed by: NURSE PRACTITIONER

## 2022-04-18 PROCEDURE — 99203 PR OFFICE/OUTPT VISIT, NEW, LEVL III, 30-44 MIN: ICD-10-PCS | Mod: S$GLB,,, | Performed by: NURSE PRACTITIONER

## 2022-04-18 PROCEDURE — U0002 COVID-19 LAB TEST NON-CDC: HCPCS | Mod: QW,CR,S$GLB, | Performed by: NURSE PRACTITIONER

## 2022-04-18 RX ORDER — POLYMYXIN B SULFATE AND TRIMETHOPRIM 1; 10000 MG/ML; [USP'U]/ML
1 SOLUTION OPHTHALMIC EVERY 4 HOURS
Qty: 10 ML | Refills: 0 | Status: SHIPPED | OUTPATIENT
Start: 2022-04-18 | End: 2022-04-25

## 2022-04-18 NOTE — PATIENT INSTRUCTIONS
- You must understand that you have received an Urgent Care treatment only and that you may be released before all of your medical problems are known or treated.   - You, the patient, will arrange for follow up care as instructed.   - If your condition worsens or fails to improve we recommend that you receive another evaluation at the ER immediately or contact your PCP to discuss your concerns.   - You can call [phone] or [phone] to help schedule an appointment with the appropriate provider.    Drink plenty of fluids   Get lots of rest  Tylenol or ibuprofen for pain/fever  Mucinex DM for daytime cough  Saline nasal rinses to irrigate sinus cavities  Warm salt water gargles for sore throat  Zyrtec and Flonase daily

## 2022-04-18 NOTE — PROGRESS NOTES
"Subjective:       Patient ID: Kyle Kent is a 27 y.o. male.    Vitals:  height is 5' 2" (1.575 m) and weight is 77.1 kg (170 lb). His oral temperature is 98.3 °F (36.8 °C). His blood pressure is 123/84 and his pulse is 98. His respiration is 18 and oxygen saturation is 95%.     Chief Complaint: Cough    Pt is a 28 yo male who has been having congestion and cough for 4 days. His caretaker says she has been off for a few days and noticed his eyes red and swollen and having drainage today when she came back on duty. Pt reports congestion, dry cough.  Denies fever. He has taken day quil for his symptoms with little relief. Pt has history of seasonal allergies.     Cough  This is a new problem. Episode onset: 4 days. The problem has been unchanged. The problem occurs hourly. The cough is non-productive. Associated symptoms include nasal congestion. Pertinent negatives include no fever or sore throat. Nothing aggravates the symptoms. Treatments tried: day quil. The treatment provided mild relief. There is no history of bronchitis.       Constitution: Negative for fever.   HENT: Negative for sore throat.    Respiratory: Positive for cough.        Objective:      Physical Exam   Constitutional: He is oriented to person, place, and time. He appears well-developed. He is cooperative.  Non-toxic appearance. He does not appear ill. No distress.   HENT:   Head: Normocephalic and atraumatic.   Ears:   Right Ear: Hearing, tympanic membrane, external ear and ear canal normal.   Left Ear: Hearing, tympanic membrane, external ear and ear canal normal.   Nose: Nose normal. No mucosal edema, rhinorrhea or nasal deformity. No epistaxis. Right sinus exhibits no maxillary sinus tenderness and no frontal sinus tenderness. Left sinus exhibits no maxillary sinus tenderness and no frontal sinus tenderness.   Mouth/Throat: Uvula is midline, oropharynx is clear and moist and mucous membranes are normal. No trismus in the jaw. Normal " dentition. No uvula swelling. No oropharyngeal exudate, posterior oropharyngeal edema or posterior oropharyngeal erythema.   Eyes: Lids are normal. Right eye exhibits exudate. Left eye exhibits exudate. Right conjunctiva is injected. Left conjunctiva is injected. No scleral icterus.        Comments: B lower lid edema    Neck: Trachea normal and phonation normal. Neck supple. No edema present. No erythema present. No neck rigidity present.   Cardiovascular: Normal rate, regular rhythm, normal heart sounds and normal pulses.   Pulmonary/Chest: Effort normal and breath sounds normal. No respiratory distress. He has no decreased breath sounds. He has no rhonchi.   Abdominal: Normal appearance.   Musculoskeletal: Normal range of motion.         General: No deformity. Normal range of motion.   Neurological: He is alert and oriented to person, place, and time. He exhibits normal muscle tone. Coordination normal.   Skin: Skin is warm, dry, intact, not diaphoretic and not pale.   Psychiatric: His speech is normal and behavior is normal. Judgment and thought content normal.   Nursing note and vitals reviewed.     Results for orders placed or performed in visit on 04/18/22   POCT COVID-19 Rapid Screening   Result Value Ref Range    POC Rapid COVID Negative Negative     Acceptable Yes              Assessment:       1. Allergic rhinitis due to other allergic trigger, unspecified seasonality    2. Cough    3. Acute bacterial conjunctivitis of both eyes          Plan:         Allergic rhinitis due to other allergic trigger, unspecified seasonality    Cough  -     POCT COVID-19 Rapid Screening    Acute bacterial conjunctivitis of both eyes  -     polymyxin B sulf-trimethoprim (POLYTRIM) 10,000 unit- 1 mg/mL Drop; Place 1 drop into both eyes every 4 (four) hours. for 7 days  Dispense: 10 mL; Refill: 0         Patient Instructions   - You must understand that you have received an Urgent Care treatment only and that you  may be released before all of your medical problems are known or treated.   - You, the patient, will arrange for follow up care as instructed.   - If your condition worsens or fails to improve we recommend that you receive another evaluation at the ER immediately or contact your PCP to discuss your concerns.   - You can call [phone] or [phone] to help schedule an appointment with the appropriate provider.    Drink plenty of fluids   Get lots of rest  Tylenol or ibuprofen for pain/fever  Mucinex DM for daytime cough  Saline nasal rinses to irrigate sinus cavities  Warm salt water gargles for sore throat  Zyrtec and Flonase daily

## 2022-05-03 LAB
ALBUMIN SERPL-MCNC: 4.7 G/DL (ref 4.1–5.2)
ALP SERPL-CCNC: 75 IU/L (ref 44–121)
ALT SERPL-CCNC: 30 IU/L (ref 0–44)
AST SERPL-CCNC: 20 IU/L (ref 0–40)
BILIRUB DIRECT SERPL-MCNC: <0.1 MG/DL (ref 0–0.4)
BILIRUB SERPL-MCNC: 0.3 MG/DL (ref 0–1.2)
CHOLEST SERPL-MCNC: 158 MG/DL (ref 100–199)
HDLC SERPL-MCNC: 37 MG/DL
IMP & REVIEW OF LAB RESULTS: NORMAL
LDLC SERPL CALC-MCNC: 81 MG/DL (ref 0–99)
PROT SERPL-MCNC: 7.8 G/DL (ref 6–8.5)
TRIGL SERPL-MCNC: 239 MG/DL (ref 0–149)
VLDLC SERPL CALC-MCNC: 40 MG/DL (ref 5–40)

## 2022-09-13 ENCOUNTER — OFFICE VISIT (OUTPATIENT)
Dept: INTERNAL MEDICINE | Facility: CLINIC | Age: 27
End: 2022-09-13
Payer: MEDICARE

## 2022-09-13 VITALS
WEIGHT: 165.44 LBS | BODY MASS INDEX: 30.44 KG/M2 | HEART RATE: 99 BPM | DIASTOLIC BLOOD PRESSURE: 73 MMHG | TEMPERATURE: 98 F | SYSTOLIC BLOOD PRESSURE: 114 MMHG | HEIGHT: 62 IN

## 2022-09-13 DIAGNOSIS — F90.2 ATTENTION DEFICIT HYPERACTIVITY DISORDER (ADHD), COMBINED TYPE: ICD-10-CM

## 2022-09-13 DIAGNOSIS — F33.41 RECURRENT MAJOR DEPRESSIVE DISORDER, IN PARTIAL REMISSION: ICD-10-CM

## 2022-09-13 DIAGNOSIS — G40.309 GENERALIZED CONVULSIVE EPILEPSY: ICD-10-CM

## 2022-09-13 DIAGNOSIS — R46.89 AGGRESSION AGGRAVATED: ICD-10-CM

## 2022-09-13 DIAGNOSIS — E78.00 PURE HYPERCHOLESTEROLEMIA: ICD-10-CM

## 2022-09-13 DIAGNOSIS — F71 MODERATE INTELLECTUAL DISABILITY WITH INTELLIGENCE QUOTIENT 35 TO 49: ICD-10-CM

## 2022-09-13 DIAGNOSIS — D72.819 LEUKOPENIA, UNSPECIFIED TYPE: ICD-10-CM

## 2022-09-13 DIAGNOSIS — E66.09 CLASS 1 OBESITY DUE TO EXCESS CALORIES WITH SERIOUS COMORBIDITY AND BODY MASS INDEX (BMI) OF 30.0 TO 30.9 IN ADULT: ICD-10-CM

## 2022-09-13 DIAGNOSIS — F84.0 AUTISM SPECTRUM DISORDER WITH ACCOMPANYING INTELLECTUAL IMPAIRMENT, REQUIRING VERY SUBSTANTIAL SUPPORT (LEVEL 3): Primary | ICD-10-CM

## 2022-09-13 DIAGNOSIS — F41.3 OTHER MIXED ANXIETY DISORDERS: ICD-10-CM

## 2022-09-13 DIAGNOSIS — F89 DEVELOPMENTAL DISORDER: ICD-10-CM

## 2022-09-13 DIAGNOSIS — Z23 NEED FOR INFLUENZA VACCINATION: ICD-10-CM

## 2022-09-13 DIAGNOSIS — G40.209 PARTIAL SYMPTOMATIC EPILEPSY WITH COMPLEX PARTIAL SEIZURES, NOT INTRACTABLE, WITHOUT STATUS EPILEPTICUS: ICD-10-CM

## 2022-09-13 DIAGNOSIS — R73.03 PRE-DIABETES: ICD-10-CM

## 2022-09-13 PROCEDURE — 90686 IIV4 VACC NO PRSV 0.5 ML IM: CPT | Mod: S$GLB,,, | Performed by: INTERNAL MEDICINE

## 2022-09-13 PROCEDURE — 90686 FLU VACCINE (QUAD) GREATER THAN OR EQUAL TO 3YO PRESERVATIVE FREE IM: ICD-10-PCS | Mod: S$GLB,,, | Performed by: INTERNAL MEDICINE

## 2022-09-13 PROCEDURE — 99214 PR OFFICE/OUTPT VISIT, EST, LEVL IV, 30-39 MIN: ICD-10-PCS | Mod: S$GLB,,, | Performed by: INTERNAL MEDICINE

## 2022-09-13 PROCEDURE — G0008 FLU VACCINE (QUAD) GREATER THAN OR EQUAL TO 3YO PRESERVATIVE FREE IM: ICD-10-PCS | Mod: S$GLB,,, | Performed by: INTERNAL MEDICINE

## 2022-09-13 PROCEDURE — G0008 ADMIN INFLUENZA VIRUS VAC: HCPCS | Mod: S$GLB,,, | Performed by: INTERNAL MEDICINE

## 2022-09-13 PROCEDURE — 99214 OFFICE O/P EST MOD 30 MIN: CPT | Mod: S$GLB,,, | Performed by: INTERNAL MEDICINE

## 2022-09-13 NOTE — PROGRESS NOTES
Chief C/o:    Hyperlipidemia, Seizures, and Anxiety        Health Care Maintenance    Health Maintenance         Date Due Completion Date    HPV Vaccines (1 - Male 2-dose series) Never done ---    TETANUS VACCINE 05/07/2030 5/7/2020                   HISTORY OF PRESENT ILLNESS:    HILDA Kent is a 27 y.o. male who presents to the clinic today for Hyperlipidemia, Seizures, and Anxiety  .   No new issues or problem, patient is coming with his mother at his side.  Patient is having no chest pain, no shortness of breath, no fever no chills.  Patient is having no side effects related to current medications.                ALLERGIES AND MEDICATIONS: updated and reviewed.  Review of patient's allergies indicates:   Allergen Reactions    Sulfa (sulfonamide antibiotics) Swelling     Medication List with Changes/Refills   Current Medications    ATORVASTATIN (LIPITOR) 20 MG TABLET    TAKE 1 TABLET (20 MG TOTAL) BY MOUTH ONCE DAILY.    CHOLECALCIFEROL, VITAMIN D3, (VITAMIN D3) 50 MCG (2,000 UNIT) TAB    Take 1 tablet (2,000 Units total) by mouth once daily.    ESCITALOPRAM OXALATE (LEXAPRO) 20 MG TABLET    Take 20 mg by mouth every morning.    LAMOTRIGINE (LAMICTAL) 200 MG TABLET    Take 200 mg by mouth 2 (two) times daily.    LEVETIRACETAM (KEPPRA) 1000 MG TABLET    Take 1,000 mg by mouth 2 (two) times daily.    OXCARBAZEPINE (TRILEPTAL) 150 MG TAB    Take 150 mg by mouth 2 (two) times daily.    OXCARBAZEPINE (TRILEPTAL) 300 MG TAB    Take 300 mg by mouth 2 (two) times daily.    QUETIAPINE (SEROQUEL) 200 MG TAB    Take 200 mg by mouth 3 (three) times daily.   Discontinued Medications    QUETIAPINE (SEROQUEL) 100 MG TAB    Take 1 tablet (100 mg total) by mouth daily as needed.    QUETIAPINE (SEROQUEL) 50 MG TABLET    Take 50 mg by mouth every morning.    XIIDRA 5 % DPET    USE IN EYE TWO TIMES A DAY AS DIRECTED       Problem List:  Patient Active Problem List   Diagnosis    Other idiopathic scoliosis, thoracolumbar  region    Aggression aggravated    Nonintractable epilepsy with complex partial seizures    Generalized convulsive epilepsy    Attention deficit hyperactivity disorder (ADHD), combined type    Moderate intellectual disability with intelligence quotient 35 to 49    Autism spectrum disorder with accompanying intellectual impairment, requiring very substantial support (level 3)    Leukopenia    Pre-diabetes    Gingival hyperplasia    Developmental disorder    Pure hypercholesterolemia    Recurrent major depressive disorder, in partial remission    Other mixed anxiety disorders    Class 1 obesity due to excess calories with serious comorbidity and body mass index (BMI) of 30.0 to 30.9 in adult         CARE TEAM:    Patient Care Team:  Toan Dunn MD as PCP - General (Internal Medicine)  Raiza Arzola LPN as Licensed Practical Nurse         REVIEW OF SYSTEMS:    Review of Systems   Constitutional:  Negative for appetite change, chills, diaphoresis, fatigue, fever and unexpected weight change.   HENT:  Negative for congestion, drooling, ear discharge, ear pain, facial swelling, hearing loss, nosebleeds, rhinorrhea, sinus pain, sneezing, sore throat, tinnitus, trouble swallowing and voice change.    Eyes:  Negative for pain, discharge, redness, itching and visual disturbance.   Respiratory:  Negative for cough, choking, chest tightness, shortness of breath, wheezing and stridor.    Cardiovascular:  Negative for chest pain, palpitations and leg swelling.   Gastrointestinal:  Negative for abdominal distention, abdominal pain, blood in stool, constipation, diarrhea, nausea and vomiting.   Endocrine: Negative for cold intolerance, heat intolerance, polydipsia, polyphagia and polyuria.   Genitourinary:  Negative for difficulty urinating, dysuria, flank pain, frequency, hematuria and urgency.   Musculoskeletal:  Negative for arthralgias, back pain, gait problem, joint swelling, myalgias, neck pain and neck  "stiffness.   Skin:  Negative for color change, pallor, rash and wound.   Allergic/Immunologic: Negative for environmental allergies, food allergies and immunocompromised state.   Neurological:  Negative for dizziness, tremors, seizures, syncope, speech difficulty, weakness, light-headedness, numbness and headaches.        Intellectual and developmental impairment   Hematological:  Negative for adenopathy. Does not bruise/bleed easily.   Psychiatric/Behavioral:  Positive for confusion. Negative for agitation, behavioral problems, decreased concentration, dysphoric mood, hallucinations, sleep disturbance and suicidal ideas. The patient is not nervous/anxious.        PHYSICAL EXAM:    Vitals:    09/13/22 0854   BP: 114/73   Pulse: 99   Temp: 97.5 °F (36.4 °C)     Weight: 75 kg (165 lb 7.3 oz)   Height: 5' 2" (157.5 cm)   Body mass index is 30.26 kg/m².  Vitals:    09/13/22 0854   BP: 114/73   Pulse: 99   Temp: 97.5 °F (36.4 °C)   TempSrc: Temporal   Weight: 75 kg (165 lb 7.3 oz)   Height: 5' 2" (1.575 m)   PainSc: 0-No pain          Physical Exam  Vitals and nursing note reviewed.   Constitutional:       General: He is not in acute distress.     Appearance: He is obese. He is not ill-appearing, toxic-appearing or diaphoretic.      Comments: Patient is alert, awake and oriented X 3.  Patient is comfortable, cooperative and in no apparent distress.     HENT:      Head: Normocephalic and atraumatic.      Right Ear: Tympanic membrane, ear canal and external ear normal. There is no impacted cerumen.      Left Ear: Tympanic membrane, ear canal and external ear normal. There is no impacted cerumen.      Nose: Nose normal. No congestion or rhinorrhea.      Mouth/Throat:      Mouth: Mucous membranes are moist.      Pharynx: Oropharynx is clear. No oropharyngeal exudate or posterior oropharyngeal erythema.      Comments: Gingival hyperplasia  Eyes:      General: No scleral icterus.        Right eye: No discharge.         Left " eye: No discharge.      Extraocular Movements: Extraocular movements intact.      Conjunctiva/sclera: Conjunctivae normal.      Pupils: Pupils are equal, round, and reactive to light.   Cardiovascular:      Rate and Rhythm: Normal rate and regular rhythm.      Pulses: Normal pulses.      Heart sounds: Normal heart sounds. No murmur heard.    No friction rub. No gallop.   Pulmonary:      Effort: Pulmonary effort is normal. No respiratory distress.      Breath sounds: Normal breath sounds. No stridor. No wheezing, rhonchi or rales.   Chest:      Chest wall: No tenderness.   Abdominal:      General: Bowel sounds are normal. There is no distension.      Palpations: Abdomen is soft. There is no mass.      Tenderness: There is no abdominal tenderness. There is no guarding or rebound.      Hernia: No hernia is present.   Musculoskeletal:         General: No swelling, tenderness, deformity or signs of injury. Normal range of motion.      Cervical back: Normal range of motion and neck supple. No rigidity.      Right lower leg: No edema.      Left lower leg: No edema.   Lymphadenopathy:      Cervical: No cervical adenopathy.   Skin:     General: Skin is warm and dry.      Capillary Refill: Capillary refill takes less than 2 seconds.      Coloration: Skin is not jaundiced or pale.      Findings: No bruising, erythema, lesion or rash.   Neurological:      General: No focal deficit present.      Mental Status: He is alert.      Cranial Nerves: No cranial nerve deficit.      Sensory: No sensory deficit.      Motor: No weakness.      Coordination: Coordination normal.      Deep Tendon Reflexes: Reflexes normal.      Comments: Patient with intellectual delay.   Psychiatric:         Mood and Affect: Mood normal.         Behavior: Behavior normal.         Thought Content: Thought content normal.         Judgment: Judgment normal.          Labs:    Lab Results   Component Value Date     (H) 03/08/2022     03/08/2022    K  3.7 03/08/2022     03/08/2022    CO2 22 03/08/2022    BUN 11 03/08/2022    CREATININE 1.10 03/08/2022    CALCIUM 9.5 03/08/2022    PROT 7.5 08/26/2021    ALBUMIN 4.7 05/02/2022    ALBUMIN 4.3 08/26/2021    BILITOT 0.3 05/02/2022    ALKPHOS 61 08/26/2021    AST 20 05/02/2022    ALT 30 05/02/2022    ANIONGAP 14 08/26/2021    ESTGFRAFRICA >60.0 08/26/2021    EGFRNONAA >60.0 08/26/2021     Lab Results   Component Value Date    WBC 4.7 03/08/2022    WBC 8.50 08/26/2021    RBC 4.75 03/08/2022    RBC 4.46 (L) 08/26/2021    HGB 15.0 03/08/2022    HGB 14.2 08/26/2021    HCT 45.1 03/08/2022    HCT 40 08/27/2021    HCT 40.3 08/26/2021    MCV 95 03/08/2022    MCV 90 08/26/2021    RDW 12.8 03/08/2022    RDW 11.8 08/26/2021     03/08/2022     08/26/2021      Lab Results   Component Value Date    CHOL 158 05/02/2022    TRIG 239 (H) 05/02/2022    HDL 37 (L) 05/02/2022    LDLCALC 81 05/02/2022     Lab Results   Component Value Date    TSH 1.999 08/26/2021     Lab Results   Component Value Date    HGBA1C 5.2 05/28/2021      No components found for: MICROALBUMIN/CREATININE    ASSESSMENT & PLAN:    1. Autism spectrum disorder with accompanying intellectual impairment, requiring very substantial support (level 3)    2. Developmental disorder    3. Generalized convulsive epilepsy    4. Moderate intellectual disability with intelligence quotient 35 to 49    5. Partial symptomatic epilepsy with complex partial seizures, not intractable, without status epilepticus    6. Aggression aggravated    7. Attention deficit hyperactivity disorder (ADHD), combined type    8. Other mixed anxiety disorders    9. Recurrent major depressive disorder, in partial remission    10. Pure hypercholesterolemia    11. Leukopenia, unspecified type    12. Pre-diabetes    13. Class 1 obesity due to excess calories with serious comorbidity and body mass index (BMI) of 30.0 to 30.9 in adult    14. Need for influenza vaccination  - Influenza -  Quadrivalent *Preferred* (6 months+) (PF)   Healthy diet, exercise, and weight monitoring are essential for weight management.    Weight loss was discussed.  Ultimate goal is BMI below 25.       Patient is stable on his current medications, will continue current medications, papers for him were filled as requested, follow-up in 3 months time.      Orders Placed This Encounter   Procedures    Influenza - Quadrivalent *Preferred* (6 months+) (PF)      Follow up in about 3 months (around 12/13/2022), or if symptoms worsen or fail to improve. or sooner as needed.    Patient was counseled and questions and concerns were addressed.    Please note:  Parts of this report were done using a dictation software, voice to text, and sometimes the text contains some uncorrected words or sentences that are missed during revision.

## 2023-03-09 ENCOUNTER — OFFICE VISIT (OUTPATIENT)
Dept: INTERNAL MEDICINE | Facility: CLINIC | Age: 28
End: 2023-03-09
Payer: MEDICARE

## 2023-03-09 VITALS
DIASTOLIC BLOOD PRESSURE: 70 MMHG | TEMPERATURE: 98 F | SYSTOLIC BLOOD PRESSURE: 113 MMHG | HEART RATE: 99 BPM | HEIGHT: 62 IN | WEIGHT: 175.25 LBS | BODY MASS INDEX: 32.25 KG/M2

## 2023-03-09 DIAGNOSIS — F89 DEVELOPMENTAL DISORDER: ICD-10-CM

## 2023-03-09 DIAGNOSIS — F71 MODERATE INTELLECTUAL DISABILITY WITH INTELLIGENCE QUOTIENT 35 TO 49: ICD-10-CM

## 2023-03-09 DIAGNOSIS — E78.00 PURE HYPERCHOLESTEROLEMIA: ICD-10-CM

## 2023-03-09 DIAGNOSIS — G40.309 GENERALIZED CONVULSIVE EPILEPSY: ICD-10-CM

## 2023-03-09 DIAGNOSIS — R73.03 PRE-DIABETES: ICD-10-CM

## 2023-03-09 DIAGNOSIS — Z00.00 ROUTINE GENERAL MEDICAL EXAMINATION AT A HEALTH CARE FACILITY: Primary | ICD-10-CM

## 2023-03-09 DIAGNOSIS — F33.41 RECURRENT MAJOR DEPRESSIVE DISORDER, IN PARTIAL REMISSION: ICD-10-CM

## 2023-03-09 DIAGNOSIS — M41.25 OTHER IDIOPATHIC SCOLIOSIS, THORACOLUMBAR REGION: ICD-10-CM

## 2023-03-09 DIAGNOSIS — D72.819 LEUKOPENIA, UNSPECIFIED TYPE: ICD-10-CM

## 2023-03-09 DIAGNOSIS — F90.2 ATTENTION DEFICIT HYPERACTIVITY DISORDER (ADHD), COMBINED TYPE: ICD-10-CM

## 2023-03-09 DIAGNOSIS — E66.09 CLASS 1 OBESITY DUE TO EXCESS CALORIES WITH SERIOUS COMORBIDITY AND BODY MASS INDEX (BMI) OF 30.0 TO 30.9 IN ADULT: ICD-10-CM

## 2023-03-09 DIAGNOSIS — F41.3 OTHER MIXED ANXIETY DISORDERS: ICD-10-CM

## 2023-03-09 DIAGNOSIS — F84.0 AUTISM SPECTRUM DISORDER: ICD-10-CM

## 2023-03-09 DIAGNOSIS — G40.209 PARTIAL SYMPTOMATIC EPILEPSY WITH COMPLEX PARTIAL SEIZURES, NOT INTRACTABLE, WITHOUT STATUS EPILEPTICUS: ICD-10-CM

## 2023-03-09 DIAGNOSIS — R46.89 PHYSICALLY AGGRESSIVE BEHAVIOR: ICD-10-CM

## 2023-03-09 DIAGNOSIS — Z74.9 DEPENDENCE ON CARE PROVIDER: ICD-10-CM

## 2023-03-09 DIAGNOSIS — K06.1 GINGIVAL HYPERPLASIA: ICD-10-CM

## 2023-03-09 PROCEDURE — G0439 PPPS, SUBSEQ VISIT: HCPCS | Mod: S$GLB,,, | Performed by: INTERNAL MEDICINE

## 2023-03-09 PROCEDURE — 99213 PR OFFICE/OUTPT VISIT, EST, LEVL III, 20-29 MIN: ICD-10-PCS | Mod: 25,S$GLB,, | Performed by: INTERNAL MEDICINE

## 2023-03-09 PROCEDURE — 99213 OFFICE O/P EST LOW 20 MIN: CPT | Mod: 25,S$GLB,, | Performed by: INTERNAL MEDICINE

## 2023-03-09 PROCEDURE — G0439 PR MEDICARE ANNUAL WELLNESS SUBSEQUENT VISIT: ICD-10-PCS | Mod: S$GLB,,, | Performed by: INTERNAL MEDICINE

## 2023-03-09 RX ORDER — OXCARBAZEPINE 300 MG/1
450 TABLET, FILM COATED ORAL
COMMUNITY
Start: 2023-02-01 | End: 2023-03-09 | Stop reason: SDUPTHER

## 2023-03-09 RX ORDER — QUETIAPINE FUMARATE 300 MG/1
300 TABLET, FILM COATED ORAL
COMMUNITY
Start: 2023-02-13

## 2023-03-09 NOTE — PROGRESS NOTES
Chief C/o:    Hyperlipidemia, Anxiety, developmental disorder, and Seizures        Health Care Maintenance    Health Maintenance         Date Due Completion Date    Hemoglobin A1c (Prediabetes) 12/30/2023 12/30/2022    TETANUS VACCINE 05/07/2030 5/7/2020                   HISTORY OF PRESENT ILLNESS:    HILDA Kent is a 28 y.o. male who presents to the clinic today for Hyperlipidemia, Anxiety, developmental disorder, and Seizures  . Patient is having no chest pain, no shortness of breath, no fever no chills.  Patient is having no side effects related to current medications.    Accompanied by his care provider.              ALLERGIES AND MEDICATIONS: updated and reviewed.  Review of patient's allergies indicates:   Allergen Reactions    Sulfa (sulfonamide antibiotics) Swelling    Trazodone Other (See Comments)     Medication List with Changes/Refills   Current Medications    ATORVASTATIN (LIPITOR) 20 MG TABLET    TAKE 1 TABLET (20 MG TOTAL) BY MOUTH ONCE DAILY.    CHOLECALCIFEROL, VITAMIN D3, (VITAMIN D3) 50 MCG (2,000 UNIT) TAB    Take 1 tablet (2,000 Units total) by mouth once daily.    ESCITALOPRAM OXALATE (LEXAPRO) 20 MG TABLET    Take 20 mg by mouth every morning.    LAMOTRIGINE (LAMICTAL) 200 MG TABLET    Take 200 mg by mouth 2 (two) times daily.    LEVETIRACETAM (KEPPRA) 1000 MG TABLET    Take 1,000 mg by mouth 2 (two) times daily.    MULTIVIT-MIN/FOLIC/VIT K/LYCOP (ONE-A-DAY MEN'S MULTIVITAMIN ORAL)    Take by mouth.    OXCARBAZEPINE (TRILEPTAL) 150 MG TAB    Take 150 mg by mouth 2 (two) times daily.    OXCARBAZEPINE (TRILEPTAL) 300 MG TAB    Take 300 mg by mouth 2 (two) times daily.    QUETIAPINE (SEROQUEL) 300 MG TAB    Take 300 mg by mouth.   Discontinued Medications    OXCARBAZEPINE (TRILEPTAL) 300 MG TAB    Take 450 mg by mouth.    QUETIAPINE (SEROQUEL) 200 MG TAB    Take 200 mg by mouth 3 (three) times daily.       Problem List:  Patient Active Problem List   Diagnosis    Other idiopathic  scoliosis, thoracolumbar region    Nonintractable epilepsy with complex partial seizures    Generalized convulsive epilepsy    Attention deficit hyperactivity disorder (ADHD), combined type    Moderate intellectual disability with intelligence quotient 35 to 49    Leukopenia    Pre-diabetes    Gingival hyperplasia    Developmental disorder    Pure hypercholesterolemia    Recurrent major depressive disorder, in partial remission    Other mixed anxiety disorders    Class 1 obesity due to excess calories with serious comorbidity and body mass index (BMI) of 30.0 to 30.9 in adult    Dependence on care provider    Physically aggressive behavior    Autism spectrum disorder requiring very substantial support (level 3)         CARE TEAM:    Patient Care Team:  Toan Dunn MD as PCP - General (Internal Medicine)  Raiza Arzola LPN as Licensed Practical Nurse         REVIEW OF SYSTEMS:    Review of Systems   Constitutional:  Negative for appetite change, chills, diaphoresis, fatigue, fever and unexpected weight change.   HENT:  Negative for congestion, drooling, ear discharge, ear pain, facial swelling, hearing loss, nosebleeds, rhinorrhea, sinus pain, sneezing, sore throat, tinnitus, trouble swallowing and voice change.    Eyes:  Negative for pain, discharge, redness, itching and visual disturbance.   Respiratory:  Negative for cough, choking, chest tightness, shortness of breath, wheezing and stridor.    Cardiovascular:  Negative for chest pain, palpitations and leg swelling.   Gastrointestinal:  Negative for abdominal distention, abdominal pain, blood in stool, constipation, diarrhea, nausea and vomiting.   Endocrine: Negative for cold intolerance, heat intolerance, polydipsia, polyphagia and polyuria.   Genitourinary:  Negative for difficulty urinating, dysuria, flank pain, frequency, hematuria and urgency.   Musculoskeletal:  Negative for arthralgias, back pain, gait problem, joint swelling, myalgias,  "neck pain and neck stiffness.   Skin:  Negative for color change, pallor, rash and wound.   Allergic/Immunologic: Negative for environmental allergies, food allergies and immunocompromised state.   Neurological:  Negative for dizziness, tremors, seizures, syncope, speech difficulty, weakness, light-headedness, numbness and headaches.        Intellectual and developmental impairment   Hematological:  Negative for adenopathy. Does not bruise/bleed easily.   Psychiatric/Behavioral:  Negative for agitation, behavioral problems, confusion, decreased concentration, dysphoric mood, hallucinations, sleep disturbance and suicidal ideas. The patient is not nervous/anxious.        PHYSICAL EXAM:    Vitals:    03/09/23 0936   BP: 113/70   Pulse: 99   Temp: 97.9 °F (36.6 °C)     Weight: 79.5 kg (175 lb 4.3 oz)   Height: 5' 2" (157.5 cm)   Body mass index is 32.06 kg/m².  Vitals:    03/09/23 0936   BP: 113/70   Pulse: 99   Temp: 97.9 °F (36.6 °C)   TempSrc: Temporal   Weight: 79.5 kg (175 lb 4.3 oz)   Height: 5' 2" (1.575 m)   PainSc: 0-No pain          Physical Exam  Vitals and nursing note reviewed.   Constitutional:       General: He is not in acute distress.     Appearance: He is obese. He is not ill-appearing, toxic-appearing or diaphoretic.      Comments: Patient is alert, awake and oriented X 3.  Patient is comfortable, cooperative and in no apparent distress.     HENT:      Head: Normocephalic and atraumatic.      Right Ear: Tympanic membrane, ear canal and external ear normal. There is no impacted cerumen.      Left Ear: Tympanic membrane, ear canal and external ear normal. There is no impacted cerumen.      Nose: Nose normal. No congestion or rhinorrhea.      Mouth/Throat:      Mouth: Mucous membranes are moist.      Pharynx: Oropharynx is clear. No oropharyngeal exudate or posterior oropharyngeal erythema.      Comments: Gingival hyperplasia  Eyes:      General: No scleral icterus.        Right eye: No discharge.       "   Left eye: No discharge.      Extraocular Movements: Extraocular movements intact.      Conjunctiva/sclera: Conjunctivae normal.      Pupils: Pupils are equal, round, and reactive to light.   Cardiovascular:      Rate and Rhythm: Normal rate and regular rhythm.      Pulses: Normal pulses.      Heart sounds: Normal heart sounds. No murmur heard.    No friction rub. No gallop.   Pulmonary:      Effort: Pulmonary effort is normal. No respiratory distress.      Breath sounds: Normal breath sounds. No stridor. No wheezing, rhonchi or rales.   Chest:      Chest wall: No tenderness.   Abdominal:      General: Bowel sounds are normal. There is no distension.      Palpations: Abdomen is soft. There is no mass.      Tenderness: There is no abdominal tenderness. There is no guarding or rebound.      Hernia: No hernia is present.   Musculoskeletal:         General: No swelling, tenderness, deformity or signs of injury. Normal range of motion.      Cervical back: Normal range of motion and neck supple. No rigidity.      Right lower leg: No edema.      Left lower leg: No edema.   Lymphadenopathy:      Cervical: No cervical adenopathy.   Skin:     General: Skin is warm and dry.      Capillary Refill: Capillary refill takes less than 2 seconds.      Coloration: Skin is not jaundiced or pale.      Findings: No bruising, erythema, lesion or rash.   Neurological:      General: No focal deficit present.      Mental Status: He is alert.      Cranial Nerves: No cranial nerve deficit.      Sensory: No sensory deficit.      Motor: No weakness.      Coordination: Coordination normal.      Deep Tendon Reflexes: Reflexes normal.      Comments: Patient with intellectual delay.   Psychiatric:         Mood and Affect: Mood normal.         Behavior: Behavior normal.         Thought Content: Thought content normal.         Judgment: Judgment normal.          Labs:    Lab Results   Component Value Date     (H) 03/08/2022      03/08/2022    K 3.7 03/08/2022     03/08/2022    CO2 22 03/08/2022    BUN 11 03/08/2022    CREATININE 1.10 03/08/2022    CALCIUM 9.5 03/08/2022    PROT 7.5 08/26/2021    ALBUMIN 4.7 05/02/2022    ALBUMIN 4.3 08/26/2021    BILITOT 0.3 05/02/2022    ALKPHOS 61 08/26/2021    AST 20 05/02/2022    ALT 30 05/02/2022    ANIONGAP 14 08/26/2021    ESTGFRAFRICA >60.0 08/26/2021    EGFRNONAA >60.0 08/26/2021     Lab Results   Component Value Date    WBC 4.7 03/08/2022    WBC 8.50 08/26/2021    RBC 4.75 03/08/2022    RBC 4.46 (L) 08/26/2021    HGB 15.0 03/08/2022    HGB 14.2 08/26/2021    HCT 45.1 03/08/2022    HCT 40 08/27/2021    HCT 40.3 08/26/2021    MCV 95 03/08/2022    MCV 90 08/26/2021    RDW 12.8 03/08/2022    RDW 11.8 08/26/2021     03/08/2022     08/26/2021      Lab Results   Component Value Date    CHOL 158 05/02/2022    TRIG 239 (H) 05/02/2022    HDL 37 (L) 05/02/2022    LDLCALC 81 05/02/2022     Lab Results   Component Value Date    TSH 1.999 08/26/2021     Lab Results   Component Value Date    HGBA1C 5.2 05/28/2021      No components found for: MICROALBUMIN/CREATININE    ASSESSMENT & PLAN:    1. Routine general medical examination at a health care facility    2. Pure hypercholesterolemia  - Lipid Panel; Future  - Lipid Panel  The current medical regimen is effective;  continue present plan and medications.  3. Pre-diabetes  - CBC Auto Differential; Future  - Comprehensive Metabolic Panel; Future  - TSH; Future  - Hemoglobin A1C; Future  - CBC Auto Differential  - Comprehensive Metabolic Panel  - TSH  - Hemoglobin A1C  Blood sugar is in the range of prediabetes.  In simple words, blood sugar is elevated more than the upper limit of normal, and less than that of Diabetes Mellitus.   Healthy diet, exercise and weight management are essential, to prevent or decrease chances of progression to f clinical Diabetes Mellitus.  4. Leukopenia, unspecified type  Recheck.  5. Class 1 obesity due to excess  calories with serious comorbidity and body mass index (BMI) of 30.0 to 30.9 in adult  Healthy diet, exercise, and weight monitoring are essential for weight management.    Weight loss was discussed.  Ultimate goal is BMI below 25.   6. Recurrent major depressive disorder, in partial remission  Doing fair currently.  7. Other mixed anxiety disorders  As above, followed by Psychiatry.  8. Physically aggressive behavior  See above.  9. Attention deficit hyperactivity disorder (ADHD), combined type    10. Dependence on care provider    11. Partial symptomatic epilepsy with complex partial seizures, not intractable, without status epilepticus    12. Generalized convulsive epilepsy    13. Moderate intellectual disability with intelligence quotient 35 to 49    14. Autism spectrum disorder    15. Developmental disorder    16. Other idiopathic scoliosis, thoracolumbar region    17. Gingival hyperplasia             Orders Placed This Encounter   Procedures    CBC Auto Differential    Comprehensive Metabolic Panel    Lipid Panel    TSH    Hemoglobin A1C      Follow up in about 3 months (around 6/9/2023), or if symptoms worsen or fail to improve. or sooner as needed.    Patient was counseled and questions and concerns were addressed.    Please note:  Parts of this report were done using a dictation software, voice to text, and sometimes the text contains some uncorrected words or sentences that are missed during revision.

## 2023-03-10 NOTE — PROGRESS NOTES
Kyle Kent presented for a follow-up Medicare AWV today. The following components were reviewed and updated:    Medical history  Family History  Social history  Allergies and Current Medications  Health Risk Assessment  Health Maintenance  Care Team    The following assessments were completed:  Depression Screening  Cognitive function Screening  Timed Get Up Test  Hearing screening    Annual wellness visit  was completed during this visit as well as Advance Care Planning.  Paper based questionnaire and assessment were completed and will be scanned to the media section of patient's EHR records..    Family History   Problem Relation Age of Onset    No Known Problems Mother     Lung cancer Father     Epilepsy Sister      Social Determinants of Health     Tobacco Use: Low Risk     Smoking Tobacco Use: Never    Smokeless Tobacco Use: Never    Passive Exposure: Not on file   Alcohol Use: Not on file   Financial Resource Strain: Not on file   Food Insecurity: Not on file   Transportation Needs: Not on file   Physical Activity: Not on file   Stress: Not on file   Social Connections: Not on file   Housing Stability: Not on file   Depression: Low Risk     Last PHQ Score: 0     Review of patient's allergies indicates:   Allergen Reactions    Sulfa (sulfonamide antibiotics) Swelling    Trazodone Other (See Comments)     Patient Active Problem List   Diagnosis    Other idiopathic scoliosis, thoracolumbar region    Nonintractable epilepsy with complex partial seizures    Generalized convulsive epilepsy    Attention deficit hyperactivity disorder (ADHD), combined type    Moderate intellectual disability with intelligence quotient 35 to 49    Leukopenia    Pre-diabetes    Gingival hyperplasia    Developmental disorder    Pure hypercholesterolemia    Recurrent major depressive disorder, in partial remission    Other mixed anxiety disorders    Class 1 obesity due to excess calories with serious comorbidity and body mass index  (BMI) of 30.0 to 30.9 in adult    Dependence on care provider    Physically aggressive behavior    Autism spectrum disorder requiring very substantial support (level 3)     History reviewed. No pertinent surgical history.  Current Outpatient Medications on File Prior to Visit   Medication Sig Dispense Refill    atorvastatin (LIPITOR) 20 MG tablet TAKE 1 TABLET (20 MG TOTAL) BY MOUTH ONCE DAILY. 30 tablet 11    cholecalciferol, vitamin D3, (VITAMIN D3) 50 mcg (2,000 unit) Tab Take 1 tablet (2,000 Units total) by mouth once daily.      EScitalopram oxalate (LEXAPRO) 20 MG tablet Take 20 mg by mouth every morning.      lamoTRIgine (LAMICTAL) 200 MG tablet Take 200 mg by mouth 2 (two) times daily.      levETIRAcetam (KEPPRA) 1000 MG tablet Take 1,000 mg by mouth 2 (two) times daily.      multivit-min/folic/vit K/lycop (ONE-A-DAY MEN'S MULTIVITAMIN ORAL) Take by mouth.      OXcarbazepine (TRILEPTAL) 150 MG Tab Take 150 mg by mouth 2 (two) times daily.      OXcarbazepine (TRILEPTAL) 300 MG Tab Take 300 mg by mouth 2 (two) times daily.      QUEtiapine (SEROQUEL) 300 MG Tab Take 300 mg by mouth.      [DISCONTINUED] OXcarbazepine (TRILEPTAL) 300 MG Tab Take 450 mg by mouth.      [DISCONTINUED] QUEtiapine (SEROQUEL) 200 MG Tab Take 200 mg by mouth 3 (three) times daily.       No current facility-administered medications on file prior to visit.     Review of Systems   Constitutional:  Negative for appetite change, chills, diaphoresis, fatigue, fever and unexpected weight change.   HENT:  Negative for congestion, drooling, ear discharge, ear pain, facial swelling, hearing loss, nosebleeds, rhinorrhea, sinus pain, sneezing, sore throat, tinnitus, trouble swallowing and voice change.    Eyes:  Negative for pain, discharge, redness, itching and visual disturbance.   Respiratory:  Negative for cough, choking, chest tightness, shortness of breath, wheezing and stridor.    Cardiovascular:  Negative for chest pain, palpitations and  "leg swelling.   Gastrointestinal:  Negative for abdominal distention, abdominal pain, blood in stool, constipation, diarrhea, nausea and vomiting.   Endocrine: Negative for cold intolerance, heat intolerance, polydipsia, polyphagia and polyuria.   Genitourinary:  Negative for difficulty urinating, dysuria, flank pain, frequency, hematuria and urgency.   Musculoskeletal:  Negative for arthralgias, back pain, gait problem, joint swelling, myalgias, neck pain and neck stiffness.   Skin:  Negative for color change, pallor, rash and wound.   Allergic/Immunologic: Negative for environmental allergies, food allergies and immunocompromised state.   Neurological:  Negative for dizziness, tremors, seizures, syncope, speech difficulty, weakness, light-headedness, numbness and headaches.        Intellectual and developmental impairment   Hematological:  Negative for adenopathy. Does not bruise/bleed easily.   Psychiatric/Behavioral:  Negative for agitation, behavioral problems, confusion, decreased concentration, dysphoric mood, hallucinations, sleep disturbance and suicidal ideas. The patient is not nervous/anxious.      Vitals:    03/09/23 0936   BP: 113/70   BP Location: Left arm   Patient Position: Sitting   BP Method: Large (Automatic)   Pulse: 99   Temp: 97.9 °F (36.6 °C)   TempSrc: Temporal   Weight: 79.5 kg (175 lb 4.3 oz)   Height: 5' 2" (1.575 m)     Body mass index is 32.06 kg/m².   ]        Diagnoses and health risks identified today and associated recommendations/orders:  1. Routine general medical examination at a health care facility    2. Pure hypercholesterolemia  - Lipid Panel; Future  - Lipid Panel  The current medical regimen is effective;  continue present plan and medications.  3. Pre-diabetes  - CBC Auto Differential; Future  - Comprehensive Metabolic Panel; Future  - TSH; Future  - Hemoglobin A1C; Future  - CBC Auto Differential  - Comprehensive Metabolic Panel  - TSH  - Hemoglobin A1C  Blood sugar is in " the range of prediabetes.  In simple words, blood sugar is elevated more than the upper limit of normal, and less than that of Diabetes Mellitus.   Healthy diet, exercise and weight management are essential, to prevent or decrease chances of progression to f clinical Diabetes Mellitus.  4. Leukopenia, unspecified type  Recheck.  5. Class 1 obesity due to excess calories with serious comorbidity and body mass index (BMI) of 30.0 to 30.9 in adult  Healthy diet, exercise, and weight monitoring are essential for weight management.    Weight loss was discussed.  Ultimate goal is BMI below 25.   6. Recurrent major depressive disorder, in partial remission  Doing fair currently.  7. Other mixed anxiety disorders  As above, followed by Psychiatry.  8. Physically aggressive behavior  See above.  9. Attention deficit hyperactivity disorder (ADHD), combined type    10. Dependence on care provider    11. Partial symptomatic epilepsy with complex partial seizures, not intractable, without status epilepticus    12. Generalized convulsive epilepsy    13. Moderate intellectual disability with intelligence quotient 35 to 49    14. Autism spectrum disorder    15. Developmental disorder    16. Other idiopathic scoliosis, thoracolumbar region    17. Gingival hyperplasia       Provided Restorationist with a 5-10 year written screening schedule and personal prevention plan. Recommendations were developed using the USPSTF age appropriate recommendations. Education, counseling, and referrals were provided as needed.  After Visit Summary printed and given to patient which includes a list of additional screenings\tests needed.    Follow up in about 3 months (around 6/9/2023), or if symptoms worsen or fail to improve.      Toan Dunn MD

## 2023-05-30 RX ORDER — ATORVASTATIN CALCIUM 20 MG/1
20 TABLET, FILM COATED ORAL DAILY
Qty: 30 TABLET | Refills: 11 | Status: SHIPPED | OUTPATIENT
Start: 2023-05-30 | End: 2024-05-29

## 2023-06-08 LAB
ALBUMIN SERPL-MCNC: 4.8 G/DL (ref 4.1–5.2)
ALBUMIN/GLOB SERPL: 1.6 {RATIO} (ref 1.2–2.2)
ALP SERPL-CCNC: 74 IU/L (ref 44–121)
ALT SERPL-CCNC: 30 IU/L (ref 0–44)
AST SERPL-CCNC: 22 IU/L (ref 0–40)
BASOPHILS # BLD AUTO: 0 X10E3/UL (ref 0–0.2)
BASOPHILS NFR BLD AUTO: 0 %
BILIRUB SERPL-MCNC: 0.2 MG/DL (ref 0–1.2)
BUN SERPL-MCNC: 15 MG/DL (ref 6–20)
BUN/CREAT SERPL: 14 (ref 9–20)
CALCIUM SERPL-MCNC: 10.3 MG/DL (ref 8.7–10.2)
CHLORIDE SERPL-SCNC: 100 MMOL/L (ref 96–106)
CHOLEST SERPL-MCNC: 174 MG/DL (ref 100–199)
CO2 SERPL-SCNC: 23 MMOL/L (ref 20–29)
CREAT SERPL-MCNC: 1.09 MG/DL (ref 0.76–1.27)
EOSINOPHIL # BLD AUTO: 0 X10E3/UL (ref 0–0.4)
EOSINOPHIL NFR BLD AUTO: 0 %
ERYTHROCYTE [DISTWIDTH] IN BLOOD BY AUTOMATED COUNT: 12.4 % (ref 11.6–15.4)
EST. GFR  (NO RACE VARIABLE): 95 ML/MIN/1.73
GLOBULIN SER CALC-MCNC: 3 G/DL (ref 1.5–4.5)
GLUCOSE SERPL-MCNC: 98 MG/DL (ref 70–99)
HBA1C MFR BLD: 5.3 % (ref 4.8–5.6)
HCT VFR BLD AUTO: 47.1 % (ref 37.5–51)
HDLC SERPL-MCNC: 48 MG/DL
HGB BLD-MCNC: 16.1 G/DL (ref 13–17.7)
IMM GRANULOCYTES # BLD AUTO: 0 X10E3/UL (ref 0–0.1)
IMM GRANULOCYTES NFR BLD AUTO: 1 %
IMP & REVIEW OF LAB RESULTS: NORMAL
LDLC SERPL CALC-MCNC: 100 MG/DL (ref 0–99)
LYMPHOCYTES # BLD AUTO: 2 X10E3/UL (ref 0.7–3.1)
LYMPHOCYTES NFR BLD AUTO: 46 %
MCH RBC QN AUTO: 32.5 PG (ref 26.6–33)
MCHC RBC AUTO-ENTMCNC: 34.2 G/DL (ref 31.5–35.7)
MCV RBC AUTO: 95 FL (ref 79–97)
MONOCYTES # BLD AUTO: 0.5 X10E3/UL (ref 0.1–0.9)
MONOCYTES NFR BLD AUTO: 12 %
NEUTROPHILS # BLD AUTO: 1.8 X10E3/UL (ref 1.4–7)
NEUTROPHILS NFR BLD AUTO: 41 %
PLATELET # BLD AUTO: 137 X10E3/UL (ref 150–450)
POTASSIUM SERPL-SCNC: 4.5 MMOL/L (ref 3.5–5.2)
PROT SERPL-MCNC: 7.8 G/DL (ref 6–8.5)
RBC # BLD AUTO: 4.95 X10E6/UL (ref 4.14–5.8)
SODIUM SERPL-SCNC: 143 MMOL/L (ref 134–144)
TRIGL SERPL-MCNC: 146 MG/DL (ref 0–149)
TSH SERPL DL<=0.005 MIU/L-ACNC: 1.95 UIU/ML (ref 0.45–4.5)
VLDLC SERPL CALC-MCNC: 26 MG/DL (ref 5–40)
WBC # BLD AUTO: 4.3 X10E3/UL (ref 3.4–10.8)

## 2023-06-15 ENCOUNTER — OFFICE VISIT (OUTPATIENT)
Dept: INTERNAL MEDICINE | Facility: CLINIC | Age: 28
End: 2023-06-15
Payer: COMMERCIAL

## 2023-06-15 ENCOUNTER — PATIENT MESSAGE (OUTPATIENT)
Dept: INTERNAL MEDICINE | Facility: CLINIC | Age: 28
End: 2023-06-15

## 2023-06-15 VITALS
TEMPERATURE: 99 F | HEIGHT: 62 IN | WEIGHT: 172.5 LBS | SYSTOLIC BLOOD PRESSURE: 117 MMHG | HEART RATE: 116 BPM | DIASTOLIC BLOOD PRESSURE: 80 MMHG | BODY MASS INDEX: 31.74 KG/M2

## 2023-06-15 DIAGNOSIS — E66.09 CLASS 1 OBESITY DUE TO EXCESS CALORIES WITH SERIOUS COMORBIDITY AND BODY MASS INDEX (BMI) OF 31.0 TO 31.9 IN ADULT: ICD-10-CM

## 2023-06-15 DIAGNOSIS — Z74.9 DEPENDENCE ON CARE PROVIDER: ICD-10-CM

## 2023-06-15 DIAGNOSIS — E78.00 PURE HYPERCHOLESTEROLEMIA: Primary | ICD-10-CM

## 2023-06-15 DIAGNOSIS — D72.819 LEUKOPENIA, UNSPECIFIED TYPE: ICD-10-CM

## 2023-06-15 DIAGNOSIS — F84.0 AUTISM SPECTRUM DISORDER REQUIRING VERY SUBSTANTIAL SUPPORT (LEVEL 3): ICD-10-CM

## 2023-06-15 DIAGNOSIS — K06.1 GINGIVAL HYPERPLASIA: ICD-10-CM

## 2023-06-15 DIAGNOSIS — G40.209 PARTIAL SYMPTOMATIC EPILEPSY WITH COMPLEX PARTIAL SEIZURES, NOT INTRACTABLE, WITHOUT STATUS EPILEPTICUS: ICD-10-CM

## 2023-06-15 DIAGNOSIS — F89 DEVELOPMENTAL DISORDER: ICD-10-CM

## 2023-06-15 DIAGNOSIS — R73.03 PRE-DIABETES: ICD-10-CM

## 2023-06-15 DIAGNOSIS — G40.309 GENERALIZED CONVULSIVE EPILEPSY: ICD-10-CM

## 2023-06-15 DIAGNOSIS — F41.3 OTHER MIXED ANXIETY DISORDERS: ICD-10-CM

## 2023-06-15 DIAGNOSIS — F90.2 ATTENTION DEFICIT HYPERACTIVITY DISORDER (ADHD), COMBINED TYPE: ICD-10-CM

## 2023-06-15 DIAGNOSIS — F33.41 RECURRENT MAJOR DEPRESSIVE DISORDER, IN PARTIAL REMISSION: ICD-10-CM

## 2023-06-15 PROCEDURE — 99214 OFFICE O/P EST MOD 30 MIN: CPT | Mod: S$GLB,,, | Performed by: INTERNAL MEDICINE

## 2023-06-15 PROCEDURE — 99214 PR OFFICE/OUTPT VISIT, EST, LEVL IV, 30-39 MIN: ICD-10-PCS | Mod: S$GLB,,, | Performed by: INTERNAL MEDICINE

## 2023-06-15 NOTE — PROGRESS NOTES
Chief C/o:    Hyperlipidemia (Lab results check.), Developmental disorder, and Anxiety        Health Care Maintenance    Health Maintenance         Date Due Completion Date    COVID-19 Vaccine (6 - Moderna series) 02/01/2022 12/7/2021    Hemoglobin A1c (Prediabetes) 06/07/2024 6/7/2023    TETANUS VACCINE 05/07/2030 5/7/2020                   HISTORY OF PRESENT ILLNESS:    HILDA Kent is a 28 y.o. male who presents to the clinic today for Hyperlipidemia (Lab results check.), Developmental disorder, and Anxiety  .   Patient presented to the office with his sitter, he feels well, has no new complain or new problems, he had blood test done.    Patient continues to see his psychiatrist and it seems he is doing fairly well on his current medications.                ALLERGIES AND MEDICATIONS: updated and reviewed.  Review of patient's allergies indicates:   Allergen Reactions    Sulfa (sulfonamide antibiotics) Swelling    Trazodone Other (See Comments)     Medication List with Changes/Refills   Current Medications    ATORVASTATIN (LIPITOR) 20 MG TABLET    TAKE 1 TABLET (20 MG TOTAL) BY MOUTH ONCE DAILY.    CHOLECALCIFEROL, VITAMIN D3, (VITAMIN D3) 50 MCG (2,000 UNIT) TAB    Take 1 tablet (2,000 Units total) by mouth once daily.    ESCITALOPRAM OXALATE (LEXAPRO) 20 MG TABLET    Take 20 mg by mouth every morning.    LAMOTRIGINE (LAMICTAL) 200 MG TABLET    Take 200 mg by mouth 2 (two) times daily.    LEVETIRACETAM (KEPPRA) 1000 MG TABLET    Take 1,000 mg by mouth 2 (two) times daily.    MULTIVIT-MIN/FOLIC/VIT K/LYCOP (ONE-A-DAY MEN'S MULTIVITAMIN ORAL)    Take by mouth.    OXCARBAZEPINE (TRILEPTAL) 150 MG TAB    Take 150 mg by mouth 2 (two) times daily.    OXCARBAZEPINE (TRILEPTAL) 300 MG TAB    Take 300 mg by mouth 2 (two) times daily.    QUETIAPINE (SEROQUEL) 300 MG TAB    Take 300 mg by mouth.       Problem List:  Patient Active Problem List   Diagnosis    Other idiopathic scoliosis, thoracolumbar region     Nonintractable epilepsy with complex partial seizures    Generalized convulsive epilepsy    Attention deficit hyperactivity disorder (ADHD), combined type    Moderate intellectual disability with intelligence quotient 35 to 49    Leukopenia    Pre-diabetes    Gingival hyperplasia    Developmental disorder    Pure hypercholesterolemia    Recurrent major depressive disorder, in partial remission    Other mixed anxiety disorders    Class 1 obesity due to excess calories with serious comorbidity and body mass index (BMI) of 31.0 to 31.9 in adult    Dependence on care provider    Physically aggressive behavior    Autism spectrum disorder requiring very substantial support (level 3)         CARE TEAM:    Patient Care Team:  Toan Dunn MD as PCP - General (Internal Medicine)  Raiza Arzola LPN as Licensed Practical Nurse         REVIEW OF SYSTEMS:    Review of Systems   Constitutional:  Negative for appetite change, chills, diaphoresis, fatigue, fever and unexpected weight change.   HENT:  Negative for congestion, drooling, ear discharge, ear pain, facial swelling, hearing loss, nosebleeds, rhinorrhea, sinus pain, sneezing, sore throat, tinnitus, trouble swallowing and voice change.    Eyes:  Negative for pain, discharge, redness, itching and visual disturbance.   Respiratory:  Negative for cough, choking, chest tightness, shortness of breath, wheezing and stridor.    Cardiovascular:  Negative for chest pain, palpitations and leg swelling.   Gastrointestinal:  Negative for abdominal distention, abdominal pain, blood in stool, constipation, diarrhea, nausea and vomiting.   Endocrine: Negative for cold intolerance, heat intolerance, polydipsia, polyphagia and polyuria.   Genitourinary:  Negative for difficulty urinating, dysuria, flank pain, frequency, hematuria and urgency.   Musculoskeletal:  Negative for arthralgias, back pain, gait problem, joint swelling, myalgias, neck pain and neck stiffness.  "  Skin:  Negative for color change, pallor, rash and wound.   Allergic/Immunologic: Negative for environmental allergies, food allergies and immunocompromised state.   Neurological:  Negative for dizziness, tremors, seizures, syncope, speech difficulty, weakness, light-headedness, numbness and headaches.        Intellectual and developmental impairment   Hematological:  Negative for adenopathy. Does not bruise/bleed easily.   Psychiatric/Behavioral:  Negative for agitation, behavioral problems, confusion, decreased concentration, dysphoric mood, hallucinations, sleep disturbance and suicidal ideas. The patient is not nervous/anxious.        PHYSICAL EXAM:    Vitals:    06/15/23 0959   BP: 117/80   Pulse: (!) 116   Temp: 98.7 °F (37.1 °C)     Weight: 78.3 kg (172 lb 8.2 oz)   Height: 5' 2" (157.5 cm)   Body mass index is 31.55 kg/m².  Vitals:    06/15/23 0959   BP: 117/80   Pulse: (!) 116   Temp: 98.7 °F (37.1 °C)   TempSrc: Temporal   Weight: 78.3 kg (172 lb 8.2 oz)   Height: 5' 2" (1.575 m)   PainSc: 0-No pain          Physical Exam  Vitals and nursing note reviewed.   Constitutional:       General: He is not in acute distress.     Appearance: He is obese. He is not ill-appearing, toxic-appearing or diaphoretic.      Comments: Patient is alert, awake and oriented X 3.  Patient is comfortable, cooperative and in no apparent distress.     HENT:      Head: Normocephalic and atraumatic.      Right Ear: Tympanic membrane, ear canal and external ear normal. There is no impacted cerumen.      Left Ear: Tympanic membrane, ear canal and external ear normal. There is no impacted cerumen.      Nose: Nose normal. No congestion or rhinorrhea.      Mouth/Throat:      Mouth: Mucous membranes are moist.      Pharynx: Oropharynx is clear. No oropharyngeal exudate or posterior oropharyngeal erythema.      Comments: Gingival hyperplasia  Eyes:      General: No scleral icterus.        Right eye: No discharge.         Left eye: No " discharge.      Extraocular Movements: Extraocular movements intact.      Conjunctiva/sclera: Conjunctivae normal.      Pupils: Pupils are equal, round, and reactive to light.   Cardiovascular:      Rate and Rhythm: Normal rate and regular rhythm.      Pulses: Normal pulses.      Heart sounds: Normal heart sounds. No murmur heard.    No friction rub. No gallop.   Pulmonary:      Effort: Pulmonary effort is normal. No respiratory distress.      Breath sounds: Normal breath sounds. No stridor. No wheezing, rhonchi or rales.   Chest:      Chest wall: No tenderness.   Abdominal:      General: Bowel sounds are normal. There is no distension.      Palpations: Abdomen is soft. There is no mass.      Tenderness: There is no abdominal tenderness. There is no guarding or rebound.      Hernia: No hernia is present.   Musculoskeletal:         General: No swelling, tenderness, deformity or signs of injury. Normal range of motion.      Cervical back: Normal range of motion and neck supple. No rigidity.      Right lower leg: No edema.      Left lower leg: No edema.   Lymphadenopathy:      Cervical: No cervical adenopathy.   Skin:     General: Skin is warm and dry.      Capillary Refill: Capillary refill takes less than 2 seconds.      Coloration: Skin is not jaundiced or pale.      Findings: No bruising, erythema, lesion or rash.   Neurological:      General: No focal deficit present.      Mental Status: He is alert.      Cranial Nerves: No cranial nerve deficit.      Sensory: No sensory deficit.      Motor: No weakness.      Coordination: Coordination normal.      Deep Tendon Reflexes: Reflexes normal.      Comments: Patient with intellectual delay.   Psychiatric:         Mood and Affect: Mood normal.         Behavior: Behavior normal.         Thought Content: Thought content normal.         Judgment: Judgment normal.          Labs:    Lab Results   Component Value Date    GLU 98 06/07/2023     06/07/2023    K 4.5  06/07/2023     06/07/2023    CO2 23 06/07/2023    BUN 15 06/07/2023    CREATININE 1.09 06/07/2023    CALCIUM 10.3 (H) 06/07/2023    PROT 7.5 08/26/2021    ALBUMIN 4.8 06/07/2023    ALBUMIN 4.3 08/26/2021    BILITOT 0.2 06/07/2023    ALKPHOS 61 08/26/2021    AST 22 06/07/2023    ALT 30 06/07/2023    ANIONGAP 14 08/26/2021    ESTGFRAFRICA >60.0 08/26/2021    EGFRNONAA >60.0 08/26/2021     Lab Results   Component Value Date    WBC 4.3 06/07/2023    WBC 8.50 08/26/2021    RBC 4.95 06/07/2023    RBC 4.46 (L) 08/26/2021    HGB 16.1 06/07/2023    HGB 14.2 08/26/2021    HCT 47.1 06/07/2023    HCT 40 08/27/2021    HCT 40.3 08/26/2021    MCV 95 06/07/2023    MCV 90 08/26/2021    RDW 12.4 06/07/2023    RDW 11.8 08/26/2021     (L) 06/07/2023     08/26/2021      Lab Results   Component Value Date    CHOL 174 06/07/2023    TRIG 146 06/07/2023    HDL 48 06/07/2023    LDLCALC 100 (H) 06/07/2023     Lab Results   Component Value Date    TSH 1.950 06/07/2023     Lab Results   Component Value Date    HGBA1C 5.3 06/07/2023      No components found for: MICROALBUMIN/CREATININE    ASSESSMENT & PLAN:    1. Pure hypercholesterolemia    2. Leukopenia, unspecified type    3. Pre-diabetes    4. Generalized convulsive epilepsy    5. Partial symptomatic epilepsy with complex partial seizures, not intractable, without status epilepticus    6. Autism spectrum disorder requiring very substantial support (level 3)    7. Developmental disorder    8. Gingival hyperplasia    9. Other mixed anxiety disorders    10. Attention deficit hyperactivity disorder (ADHD), combined type    11. Recurrent major depressive disorder, in partial remission    12. Class 1 obesity due to excess calories with serious comorbidity and body mass index (BMI) of 31.0 to 31.9 in adult    13. Dependence on care provider     Patient seems to be well maintained in general, he is to continue his current medications, continue his follow-up with other specialist.     He had history of leukopenia, his white BCs currently are normal, low normal, will keep monitoring at intervals, these are not expected to cause any problems, and mostly related to the medication he is taking.    Healthy diet and exercise and weight management are recommended.        No orders of the defined types were placed in this encounter.     Follow up in about 3 months (around 9/15/2023), or if symptoms worsen or fail to improve. or sooner as needed.    Patient was counseled and questions and concerns were addressed.    Please note:  Parts of this report were done using a dictation software, voice to text, and sometimes the text contains some uncorrected words or sentences that are missed during revision.